# Patient Record
Sex: MALE | Race: WHITE | NOT HISPANIC OR LATINO | Employment: FULL TIME | URBAN - METROPOLITAN AREA
[De-identification: names, ages, dates, MRNs, and addresses within clinical notes are randomized per-mention and may not be internally consistent; named-entity substitution may affect disease eponyms.]

---

## 2017-01-16 ENCOUNTER — COMMUNICATION - HEALTHEAST (OUTPATIENT)
Dept: FAMILY MEDICINE | Facility: CLINIC | Age: 52
End: 2017-01-16

## 2017-01-25 ENCOUNTER — RECORDS - HEALTHEAST (OUTPATIENT)
Dept: ADMINISTRATIVE | Facility: OTHER | Age: 52
End: 2017-01-25

## 2017-02-17 ENCOUNTER — COMMUNICATION - HEALTHEAST (OUTPATIENT)
Dept: FAMILY MEDICINE | Facility: CLINIC | Age: 52
End: 2017-02-17

## 2017-02-28 ENCOUNTER — OFFICE VISIT - HEALTHEAST (OUTPATIENT)
Dept: FAMILY MEDICINE | Facility: CLINIC | Age: 52
End: 2017-02-28

## 2017-02-28 DIAGNOSIS — E11.9 TYPE 2 DIABETES MELLITUS WITHOUT COMPLICATION (H): ICD-10-CM

## 2017-02-28 ASSESSMENT — MIFFLIN-ST. JEOR: SCORE: 1642.72

## 2017-06-12 ENCOUNTER — COMMUNICATION - HEALTHEAST (OUTPATIENT)
Dept: FAMILY MEDICINE | Facility: CLINIC | Age: 52
End: 2017-06-12

## 2017-12-13 ENCOUNTER — OFFICE VISIT - HEALTHEAST (OUTPATIENT)
Dept: FAMILY MEDICINE | Facility: CLINIC | Age: 52
End: 2017-12-13

## 2017-12-13 DIAGNOSIS — Z23 NEED FOR IMMUNIZATION AGAINST INFLUENZA: ICD-10-CM

## 2017-12-13 DIAGNOSIS — K42.9 UMBILICAL HERNIA: ICD-10-CM

## 2017-12-13 ASSESSMENT — MIFFLIN-ST. JEOR: SCORE: 1647.26

## 2017-12-28 ENCOUNTER — COMMUNICATION - HEALTHEAST (OUTPATIENT)
Dept: SURGERY | Facility: CLINIC | Age: 52
End: 2017-12-28

## 2018-01-08 ENCOUNTER — OFFICE VISIT - HEALTHEAST (OUTPATIENT)
Dept: SURGERY | Facility: CLINIC | Age: 53
End: 2018-01-08

## 2018-01-08 DIAGNOSIS — K43.2 RECURRENT INCISIONAL HERNIA: ICD-10-CM

## 2018-01-08 ASSESSMENT — MIFFLIN-ST. JEOR: SCORE: 1643.18

## 2018-01-10 ENCOUNTER — OFFICE VISIT - HEALTHEAST (OUTPATIENT)
Dept: FAMILY MEDICINE | Facility: CLINIC | Age: 53
End: 2018-01-10

## 2018-01-10 DIAGNOSIS — Z01.818 PREOP EXAMINATION: ICD-10-CM

## 2018-01-10 DIAGNOSIS — K42.9 UMBILICAL HERNIA WITHOUT OBSTRUCTION AND WITHOUT GANGRENE: ICD-10-CM

## 2018-01-10 DIAGNOSIS — E11.9 DIABETES (H): ICD-10-CM

## 2018-01-10 LAB
ANION GAP SERPL CALCULATED.3IONS-SCNC: 11 MMOL/L (ref 5–18)
BUN SERPL-MCNC: 19 MG/DL (ref 8–22)
CALCIUM SERPL-MCNC: 9.8 MG/DL (ref 8.5–10.5)
CHLORIDE BLD-SCNC: 101 MMOL/L (ref 98–107)
CHOLEST SERPL-MCNC: 235 MG/DL
CO2 SERPL-SCNC: 24 MMOL/L (ref 22–31)
CREAT SERPL-MCNC: 0.98 MG/DL (ref 0.7–1.3)
ERYTHROCYTE [DISTWIDTH] IN BLOOD BY AUTOMATED COUNT: 12.2 % (ref 11–14.5)
FASTING STATUS PATIENT QL REPORTED: NO
GFR SERPL CREATININE-BSD FRML MDRD: >60 ML/MIN/1.73M2
GLUCOSE BLD-MCNC: 146 MG/DL (ref 70–125)
HBA1C MFR BLD: 9.1 % (ref 3.5–6)
HCT VFR BLD AUTO: 45.3 % (ref 40–54)
HDLC SERPL-MCNC: 41 MG/DL
HGB BLD-MCNC: 15.1 G/DL (ref 14–18)
LDLC SERPL CALC-MCNC: 118 MG/DL
LDLC SERPL CALC-MCNC: ABNORMAL MG/DL
MCH RBC QN AUTO: 30.3 PG (ref 27–34)
MCHC RBC AUTO-ENTMCNC: 33.3 G/DL (ref 32–36)
MCV RBC AUTO: 91 FL (ref 80–100)
PLATELET # BLD AUTO: 308 THOU/UL (ref 140–440)
PMV BLD AUTO: 7 FL (ref 7–10)
POTASSIUM BLD-SCNC: 4 MMOL/L (ref 3.5–5)
RBC # BLD AUTO: 4.98 MILL/UL (ref 4.4–6.2)
SODIUM SERPL-SCNC: 136 MMOL/L (ref 136–145)
TRIGL SERPL-MCNC: 627 MG/DL
WBC: 7 THOU/UL (ref 4–11)

## 2018-01-10 ASSESSMENT — MIFFLIN-ST. JEOR: SCORE: 1647.26

## 2018-01-15 ENCOUNTER — COMMUNICATION - HEALTHEAST (OUTPATIENT)
Dept: FAMILY MEDICINE | Facility: CLINIC | Age: 53
End: 2018-01-15

## 2018-01-24 ASSESSMENT — MIFFLIN-ST. JEOR: SCORE: 1663.14

## 2018-01-25 ENCOUNTER — ANESTHESIA - HEALTHEAST (OUTPATIENT)
Dept: SURGERY | Facility: HOSPITAL | Age: 53
End: 2018-01-25

## 2018-01-26 ENCOUNTER — SURGERY - HEALTHEAST (OUTPATIENT)
Dept: SURGERY | Facility: HOSPITAL | Age: 53
End: 2018-01-26

## 2018-02-01 ENCOUNTER — AMBULATORY - HEALTHEAST (OUTPATIENT)
Dept: SURGERY | Facility: CLINIC | Age: 53
End: 2018-02-01

## 2018-02-09 ENCOUNTER — COMMUNICATION - HEALTHEAST (OUTPATIENT)
Dept: SCHEDULING | Facility: CLINIC | Age: 53
End: 2018-02-09

## 2018-02-09 ENCOUNTER — OFFICE VISIT - HEALTHEAST (OUTPATIENT)
Dept: SURGERY | Facility: CLINIC | Age: 53
End: 2018-02-09

## 2018-02-09 DIAGNOSIS — Z48.89 POSTOPERATIVE VISIT: ICD-10-CM

## 2018-03-06 ENCOUNTER — COMMUNICATION - HEALTHEAST (OUTPATIENT)
Dept: FAMILY MEDICINE | Facility: CLINIC | Age: 53
End: 2018-03-06

## 2018-03-06 DIAGNOSIS — E11.9 TYPE 2 DIABETES MELLITUS WITHOUT COMPLICATION (H): ICD-10-CM

## 2018-03-12 ENCOUNTER — AMBULATORY - HEALTHEAST (OUTPATIENT)
Dept: FAMILY MEDICINE | Facility: CLINIC | Age: 53
End: 2018-03-12

## 2018-03-12 ENCOUNTER — AMBULATORY - HEALTHEAST (OUTPATIENT)
Dept: LAB | Facility: CLINIC | Age: 53
End: 2018-03-12

## 2018-03-12 LAB — HBA1C MFR BLD: 8.7 % (ref 3.5–6)

## 2018-03-14 ENCOUNTER — COMMUNICATION - HEALTHEAST (OUTPATIENT)
Dept: FAMILY MEDICINE | Facility: CLINIC | Age: 53
End: 2018-03-14

## 2018-03-26 ENCOUNTER — AMBULATORY - HEALTHEAST (OUTPATIENT)
Dept: FAMILY MEDICINE | Facility: CLINIC | Age: 53
End: 2018-03-26

## 2018-03-26 ENCOUNTER — OFFICE VISIT - HEALTHEAST (OUTPATIENT)
Dept: FAMILY MEDICINE | Facility: CLINIC | Age: 53
End: 2018-03-26

## 2018-03-26 DIAGNOSIS — M79.2 NEURITIS: ICD-10-CM

## 2018-03-26 DIAGNOSIS — K21.9 ESOPHAGEAL REFLUX: ICD-10-CM

## 2018-03-26 DIAGNOSIS — Z00.00 HEALTHCARE MAINTENANCE: ICD-10-CM

## 2018-03-26 LAB
TSH SERPL DL<=0.005 MIU/L-ACNC: 1.67 UIU/ML (ref 0.3–5)
VIT B12 SERPL-MCNC: 597 PG/ML (ref 213–816)

## 2018-03-26 ASSESSMENT — MIFFLIN-ST. JEOR: SCORE: 1633.65

## 2018-03-27 ENCOUNTER — COMMUNICATION - HEALTHEAST (OUTPATIENT)
Dept: FAMILY MEDICINE | Facility: CLINIC | Age: 53
End: 2018-03-27

## 2018-04-26 ENCOUNTER — RECORDS - HEALTHEAST (OUTPATIENT)
Dept: ADMINISTRATIVE | Facility: OTHER | Age: 53
End: 2018-04-26

## 2018-05-03 ENCOUNTER — COMMUNICATION - HEALTHEAST (OUTPATIENT)
Dept: FAMILY MEDICINE | Facility: CLINIC | Age: 53
End: 2018-05-03

## 2018-05-06 ENCOUNTER — COMMUNICATION - HEALTHEAST (OUTPATIENT)
Dept: FAMILY MEDICINE | Facility: CLINIC | Age: 53
End: 2018-05-06

## 2018-05-06 DIAGNOSIS — E11.9 TYPE 2 DIABETES MELLITUS WITHOUT COMPLICATION (H): ICD-10-CM

## 2018-05-24 ENCOUNTER — COMMUNICATION - HEALTHEAST (OUTPATIENT)
Dept: FAMILY MEDICINE | Facility: CLINIC | Age: 53
End: 2018-05-24

## 2018-06-01 ENCOUNTER — COMMUNICATION - HEALTHEAST (OUTPATIENT)
Dept: FAMILY MEDICINE | Facility: CLINIC | Age: 53
End: 2018-06-01

## 2018-06-01 DIAGNOSIS — E11.9 TYPE 2 DIABETES MELLITUS WITHOUT COMPLICATION (H): ICD-10-CM

## 2018-06-23 ENCOUNTER — COMMUNICATION - HEALTHEAST (OUTPATIENT)
Dept: FAMILY MEDICINE | Facility: CLINIC | Age: 53
End: 2018-06-23

## 2018-09-04 ENCOUNTER — COMMUNICATION - HEALTHEAST (OUTPATIENT)
Dept: FAMILY MEDICINE | Facility: CLINIC | Age: 53
End: 2018-09-04

## 2018-09-11 ENCOUNTER — AMBULATORY - HEALTHEAST (OUTPATIENT)
Dept: FAMILY MEDICINE | Facility: CLINIC | Age: 53
End: 2018-09-11

## 2018-09-11 DIAGNOSIS — E11.9 DIABETES MELLITUS (H): ICD-10-CM

## 2018-10-01 ENCOUNTER — OFFICE VISIT - HEALTHEAST (OUTPATIENT)
Dept: FAMILY MEDICINE | Facility: CLINIC | Age: 53
End: 2018-10-01

## 2018-10-01 DIAGNOSIS — E11.9 DIABETES MELLITUS, TYPE 2 (H): ICD-10-CM

## 2018-10-01 DIAGNOSIS — R19.7 DIARRHEA, UNSPECIFIED TYPE: ICD-10-CM

## 2018-10-01 DIAGNOSIS — G62.9 PERIPHERAL NEUROPATHY: ICD-10-CM

## 2018-10-01 LAB — HBA1C MFR BLD: 9.5 % (ref 3.5–6)

## 2018-10-01 ASSESSMENT — MIFFLIN-ST. JEOR: SCORE: 1642.72

## 2018-10-02 ENCOUNTER — COMMUNICATION - HEALTHEAST (OUTPATIENT)
Dept: FAMILY MEDICINE | Facility: CLINIC | Age: 53
End: 2018-10-02

## 2018-10-02 LAB
ANION GAP SERPL CALCULATED.3IONS-SCNC: 15 MMOL/L (ref 5–18)
BUN SERPL-MCNC: 23 MG/DL (ref 8–22)
CALCIUM SERPL-MCNC: 10.3 MG/DL (ref 8.5–10.5)
CHLORIDE BLD-SCNC: 104 MMOL/L (ref 98–107)
CO2 SERPL-SCNC: 20 MMOL/L (ref 22–31)
CREAT SERPL-MCNC: 1.18 MG/DL (ref 0.7–1.3)
GFR SERPL CREATININE-BSD FRML MDRD: >60 ML/MIN/1.73M2
GLUCOSE BLD-MCNC: 257 MG/DL (ref 70–125)
POTASSIUM BLD-SCNC: 4.4 MMOL/L (ref 3.5–5)
SODIUM SERPL-SCNC: 139 MMOL/L (ref 136–145)

## 2018-10-08 ENCOUNTER — RECORDS - HEALTHEAST (OUTPATIENT)
Dept: ADMINISTRATIVE | Facility: OTHER | Age: 53
End: 2018-10-08

## 2018-10-17 ENCOUNTER — COMMUNICATION - HEALTHEAST (OUTPATIENT)
Dept: FAMILY MEDICINE | Facility: CLINIC | Age: 53
End: 2018-10-17

## 2018-10-18 ENCOUNTER — AMBULATORY - HEALTHEAST (OUTPATIENT)
Dept: FAMILY MEDICINE | Facility: CLINIC | Age: 53
End: 2018-10-18

## 2018-10-18 DIAGNOSIS — E11.9 TYPE 2 DIABETES MELLITUS WITHOUT COMPLICATION, WITHOUT LONG-TERM CURRENT USE OF INSULIN (H): ICD-10-CM

## 2018-12-10 ENCOUNTER — RECORDS - HEALTHEAST (OUTPATIENT)
Dept: ADMINISTRATIVE | Facility: OTHER | Age: 53
End: 2018-12-10

## 2018-12-10 ENCOUNTER — RECORDS - HEALTHEAST (OUTPATIENT)
Dept: LAB | Facility: HOSPITAL | Age: 53
End: 2018-12-10

## 2018-12-10 LAB
ERYTHROCYTE [SEDIMENTATION RATE] IN BLOOD BY WESTERGREN METHOD: 8 MM/HR (ref 0–15)
LYME TOTAL ANTIBODY - HISTORICAL: 0.04 INDEX VALUE

## 2018-12-12 LAB
ALBUMIN PERCENT: 60.5 % (ref 51–67)
ALBUMIN SERPL ELPH-MCNC: 4.9 G/DL (ref 3.2–4.7)
ALPHA 1 PERCENT: 1.8 % (ref 2–4)
ALPHA 2 PERCENT: 7.5 % (ref 5–13)
ALPHA1 GLOB SERPL ELPH-MCNC: 0.1 G/DL (ref 0.1–0.3)
ALPHA2 GLOB SERPL ELPH-MCNC: 0.6 G/DL (ref 0.4–0.9)
B-GLOBULIN SERPL ELPH-MCNC: 1.1 G/DL (ref 0.7–1.2)
BETA PERCENT: 14.1 % (ref 10–17)
GAMMA GLOB SERPL ELPH-MCNC: 1.3 G/DL (ref 0.6–1.4)
GAMMA GLOBULIN PERCENT: 16.1 % (ref 9–20)
PATH ICD:: ABNORMAL
PROT PATTERN SERPL ELPH-IMP: ABNORMAL
PROT SERPL-MCNC: 8.1 G/DL (ref 6–8)
REVIEWING PATHOLOGIST: ABNORMAL

## 2018-12-20 ENCOUNTER — OFFICE VISIT - HEALTHEAST (OUTPATIENT)
Dept: FAMILY MEDICINE | Facility: CLINIC | Age: 53
End: 2018-12-20

## 2018-12-20 DIAGNOSIS — G62.89 OTHER POLYNEUROPATHY: ICD-10-CM

## 2018-12-20 DIAGNOSIS — Z23 NEED FOR IMMUNIZATION AGAINST INFLUENZA: ICD-10-CM

## 2018-12-20 DIAGNOSIS — E78.5 HYPERLIPIDEMIA, UNSPECIFIED HYPERLIPIDEMIA TYPE: ICD-10-CM

## 2018-12-20 DIAGNOSIS — E11.9 TYPE 2 DIABETES MELLITUS WITHOUT COMPLICATION, WITHOUT LONG-TERM CURRENT USE OF INSULIN (H): ICD-10-CM

## 2018-12-20 LAB
CHOLEST SERPL-MCNC: 308 MG/DL
CREAT UR-MCNC: 54.1 MG/DL
FASTING STATUS PATIENT QL REPORTED: YES
HBA1C MFR BLD: 9.3 % (ref 3.5–6)
HDLC SERPL-MCNC: 43 MG/DL
LDLC SERPL CALC-MCNC: 156 MG/DL
LDLC SERPL CALC-MCNC: ABNORMAL MG/DL
MICROALBUMIN UR-MCNC: 1.72 MG/DL (ref 0–1.99)
MICROALBUMIN/CREAT UR: 31.8 MG/G
TRIGL SERPL-MCNC: 597 MG/DL

## 2018-12-21 LAB
PATH ICD:: NORMAL
PROT PATTERN SERPL IFE-IMP: NORMAL
REVIEWING PATHOLOGIST: NORMAL

## 2018-12-26 ENCOUNTER — COMMUNICATION - HEALTHEAST (OUTPATIENT)
Dept: FAMILY MEDICINE | Facility: CLINIC | Age: 53
End: 2018-12-26

## 2018-12-26 DIAGNOSIS — G63 NEUROPATHY WITH IGM MONOCLONAL GAMMOPATHY (H): ICD-10-CM

## 2018-12-26 DIAGNOSIS — D47.2 NEUROPATHY WITH IGM MONOCLONAL GAMMOPATHY (H): ICD-10-CM

## 2019-01-03 ENCOUNTER — TELEPHONE (OUTPATIENT)
Dept: ONCOLOGY | Facility: CLINIC | Age: 54
End: 2019-01-03

## 2019-01-03 ENCOUNTER — COMMUNICATION - HEALTHEAST (OUTPATIENT)
Dept: ONCOLOGY | Facility: HOSPITAL | Age: 54
End: 2019-01-03

## 2019-01-03 NOTE — TELEPHONE ENCOUNTER
ONCOLOGY INTAKE: Records Information      APPT INFORMATION:  Referring provider:  roland Astudillo   Referring provider s clinic:  [facility]  Reason for visit/diagnosis:  Monoclonal Gammopathy    Were the records received with the referral (via [employer])? Referring clinic sending via fax 1/3/2019 10:22 AM     Has patient been seen for any external appt for this diagnosis (enter clinic/location)? No

## 2019-01-15 ENCOUNTER — COMMUNICATION - HEALTHEAST (OUTPATIENT)
Dept: FAMILY MEDICINE | Facility: CLINIC | Age: 54
End: 2019-01-15

## 2019-01-15 DIAGNOSIS — E11.9 TYPE 2 DIABETES MELLITUS WITHOUT COMPLICATION, WITHOUT LONG-TERM CURRENT USE OF INSULIN (H): ICD-10-CM

## 2019-01-27 NOTE — TELEPHONE ENCOUNTER
RECORDS STATUS - ALL OTHER DIAGNOSIS      RECORDS RECEIVED FROM: [facility] (in [facility])   DATE RECEIVED: 1/27/19   NOTES STATUS DETAILS   OFFICE NOTE from referring provider 12/20/18 [facility] (In [facility])     OFFICE NOTE from medical oncologist In [facility] (In [facility])     DISCHARGE SUMMARY from hospital In [facility] (In [facility])     DISCHARGE REPORT from the ER In [facility] (In [facility])     OPERATIVE REPORT In [facility] (In [facility])     MEDICATION LIST In [facility] (In [facility])     CLINICAL TRIAL TREATMENTS TO DATE In [facility] (In [facility])     LABS     PATHOLOGY REPORTS In [facility] (In [facility])     ANYTHING RELATED TO DIAGNOSIS In [facility] (In [facility])     GENONOMIC TESTING     TYPE: In [facility] (In [facility])   IMAGING (NEED IMAGES & REPORT)     CT SCANS In [facility] (In [facility])   MRI In [facility] (In [facility])   MAMMO In [facility] (In [facility])   ULTRASOUND In [facility] (In [facility])   PET In [facility] (In [facility])

## 2019-01-30 ENCOUNTER — RECORDS - HEALTHEAST (OUTPATIENT)
Dept: ADMINISTRATIVE | Facility: OTHER | Age: 54
End: 2019-01-30

## 2019-01-30 ENCOUNTER — ONCOLOGY VISIT (OUTPATIENT)
Dept: ONCOLOGY | Facility: CLINIC | Age: 54
End: 2019-01-30
Attending: INTERNAL MEDICINE
Payer: COMMERCIAL

## 2019-01-30 ENCOUNTER — PRE VISIT (OUTPATIENT)
Dept: ONCOLOGY | Facility: CLINIC | Age: 54
End: 2019-01-30

## 2019-01-30 VITALS
HEART RATE: 64 BPM | RESPIRATION RATE: 16 BRPM | OXYGEN SATURATION: 95 % | DIASTOLIC BLOOD PRESSURE: 84 MMHG | TEMPERATURE: 97 F | SYSTOLIC BLOOD PRESSURE: 130 MMHG | WEIGHT: 175.8 LBS

## 2019-01-30 DIAGNOSIS — D47.2 MONOCLONAL PARAPROTEINEMIA: Primary | ICD-10-CM

## 2019-01-30 DIAGNOSIS — D47.2 MONOCLONAL PARAPROTEINEMIA: ICD-10-CM

## 2019-01-30 LAB
ALBUMIN SERPL-MCNC: 4.2 G/DL (ref 3.4–5)
ALP SERPL-CCNC: 40 U/L (ref 40–150)
ALT SERPL W P-5'-P-CCNC: 24 U/L (ref 0–70)
ANION GAP SERPL CALCULATED.3IONS-SCNC: 9 MMOL/L (ref 3–14)
AST SERPL W P-5'-P-CCNC: 18 U/L (ref 0–45)
BASOPHILS # BLD AUTO: 0.1 10E9/L (ref 0–0.2)
BASOPHILS NFR BLD AUTO: 0.9 %
BILIRUB SERPL-MCNC: 0.5 MG/DL (ref 0.2–1.3)
BUN SERPL-MCNC: 25 MG/DL (ref 7–30)
CALCIUM SERPL-MCNC: 8.9 MG/DL (ref 8.5–10.1)
CHLORIDE SERPL-SCNC: 108 MMOL/L (ref 94–109)
CO2 SERPL-SCNC: 22 MMOL/L (ref 20–32)
CREAT SERPL-MCNC: 0.98 MG/DL (ref 0.66–1.25)
DIFFERENTIAL METHOD BLD: NORMAL
EOSINOPHIL # BLD AUTO: 0.1 10E9/L (ref 0–0.7)
EOSINOPHIL NFR BLD AUTO: 1.7 %
ERYTHROCYTE [DISTWIDTH] IN BLOOD BY AUTOMATED COUNT: 13.5 % (ref 10–15)
GFR SERPL CREATININE-BSD FRML MDRD: 86 ML/MIN/{1.73_M2}
GLUCOSE SERPL-MCNC: 111 MG/DL (ref 70–99)
HCT VFR BLD AUTO: 50.7 % (ref 40–53)
HGB BLD-MCNC: 17.3 G/DL (ref 13.3–17.7)
IMM GRANULOCYTES # BLD: 0 10E9/L (ref 0–0.4)
IMM GRANULOCYTES NFR BLD: 0.3 %
LDH SERPL L TO P-CCNC: 148 U/L (ref 85–227)
LYMPHOCYTES # BLD AUTO: 3.3 10E9/L (ref 0.8–5.3)
LYMPHOCYTES NFR BLD AUTO: 47.4 %
MCH RBC QN AUTO: 30.4 PG (ref 26.5–33)
MCHC RBC AUTO-ENTMCNC: 34.1 G/DL (ref 31.5–36.5)
MCV RBC AUTO: 89 FL (ref 78–100)
MONOCYTES # BLD AUTO: 0.4 10E9/L (ref 0–1.3)
MONOCYTES NFR BLD AUTO: 6.3 %
NEUTROPHILS # BLD AUTO: 3 10E9/L (ref 1.6–8.3)
NEUTROPHILS NFR BLD AUTO: 43.4 %
NRBC # BLD AUTO: 0 10*3/UL
NRBC BLD AUTO-RTO: 0 /100
PLATELET # BLD AUTO: 277 10E9/L (ref 150–450)
POTASSIUM SERPL-SCNC: 3.8 MMOL/L (ref 3.4–5.3)
PROT SERPL-MCNC: 8.8 G/DL (ref 6.8–8.8)
RBC # BLD AUTO: 5.7 10E12/L (ref 4.4–5.9)
SODIUM SERPL-SCNC: 138 MMOL/L (ref 133–144)
WBC # BLD AUTO: 6.9 10E9/L (ref 4–11)

## 2019-01-30 PROCEDURE — 86334 IMMUNOFIX E-PHORESIS SERUM: CPT | Performed by: INTERNAL MEDICINE

## 2019-01-30 PROCEDURE — 82784 ASSAY IGA/IGD/IGG/IGM EACH: CPT | Performed by: INTERNAL MEDICINE

## 2019-01-30 PROCEDURE — 82232 ASSAY OF BETA-2 PROTEIN: CPT | Performed by: INTERNAL MEDICINE

## 2019-01-30 PROCEDURE — 36415 COLL VENOUS BLD VENIPUNCTURE: CPT

## 2019-01-30 PROCEDURE — 83883 ASSAY NEPHELOMETRY NOT SPEC: CPT | Performed by: INTERNAL MEDICINE

## 2019-01-30 PROCEDURE — 80053 COMPREHEN METABOLIC PANEL: CPT | Performed by: INTERNAL MEDICINE

## 2019-01-30 PROCEDURE — 99205 OFFICE O/P NEW HI 60 MIN: CPT | Mod: ZP | Performed by: INTERNAL MEDICINE

## 2019-01-30 PROCEDURE — 00000402 ZZHCL STATISTIC TOTAL PROTEIN: Performed by: INTERNAL MEDICINE

## 2019-01-30 PROCEDURE — G0463 HOSPITAL OUTPT CLINIC VISIT: HCPCS | Mod: ZF

## 2019-01-30 PROCEDURE — 83615 LACTATE (LD) (LDH) ENZYME: CPT | Performed by: INTERNAL MEDICINE

## 2019-01-30 PROCEDURE — 85025 COMPLETE CBC W/AUTO DIFF WBC: CPT | Performed by: INTERNAL MEDICINE

## 2019-01-30 PROCEDURE — 84165 PROTEIN E-PHORESIS SERUM: CPT | Performed by: INTERNAL MEDICINE

## 2019-01-30 RX ORDER — LISINOPRIL 5 MG/1
TABLET ORAL
Refills: 1 | COMMUNITY
Start: 2019-01-10 | End: 2023-11-15

## 2019-01-30 RX ORDER — EMPAGLIFLOZIN 10 MG/1
TABLET, FILM COATED ORAL
Refills: 11 | COMMUNITY
Start: 2019-01-17 | End: 2022-09-13

## 2019-01-30 RX ORDER — TERAZOSIN 2 MG/1
CAPSULE ORAL
Refills: 3 | COMMUNITY
Start: 2019-01-10

## 2019-01-30 RX ORDER — GLIMEPIRIDE 2 MG/1
TABLET ORAL
Refills: 10 | COMMUNITY
Start: 2019-01-16 | End: 2021-10-30

## 2019-01-30 RX ORDER — VALACYCLOVIR HYDROCHLORIDE 500 MG/1
TABLET, FILM COATED ORAL
Refills: 0 | COMMUNITY
Start: 2019-01-10

## 2019-01-30 RX ORDER — FENOFIBRATE 145 MG/1
145 TABLET, COATED ORAL
COMMUNITY

## 2019-01-30 RX ORDER — EMTRICITABINE AND TENOFOVIR ALAFENAMIDE 200; 25 MG/1; MG/1
TABLET ORAL
Refills: 1 | COMMUNITY
Start: 2019-01-17 | End: 2024-10-03

## 2019-01-30 SDOH — HEALTH STABILITY: MENTAL HEALTH: HOW OFTEN DO YOU HAVE A DRINK CONTAINING ALCOHOL?: 2-3 TIMES A WEEK

## 2019-01-30 SDOH — HEALTH STABILITY: MENTAL HEALTH: HOW MANY STANDARD DRINKS CONTAINING ALCOHOL DO YOU HAVE ON A TYPICAL DAY?: 1 OR 2

## 2019-01-30 ASSESSMENT — PAIN SCALES - GENERAL: PAINLEVEL: NO PAIN (0)

## 2019-01-30 NOTE — NURSING NOTE
Chief Complaint   Patient presents with     Lab Only     labs drawn via vpt by rn. 24 hour urine jug and instructions given to patient.      Blood drawn via vpt by RN in lab. 24 urine jug given to patient.     Chantelle Mota RN

## 2019-01-30 NOTE — NURSING NOTE
Oncology Rooming Note    January 30, 2019 11:12 AM   Pb Langston is a 53 year old male who presents for:    Chief Complaint   Patient presents with     Oncology Clinic Visit     Gammopathy     Initial Vitals: /84   Pulse 64   Temp 97  F (36.1  C) (Tympanic)   Resp 16   Wt 79.7 kg (175 lb 12.8 oz)   SpO2 95%  There is no height or weight on file to calculate BMI. There is no height or weight on file to calculate BSA.  No Pain (0) Comment: Data Unavailable   No LMP for male patient.  Allergies reviewed: Yes  Medications reviewed: Yes    Medications: Medication refills not needed today.  [facility] into [facility] [address] [place] [address] AT [facility]    Clinical concerns: No New Concerns    5 minutes for nursing intake (face to face time)     ABRAN Lanza

## 2019-01-30 NOTE — PROGRESS NOTES
[facility] Hematology Consultation    Outpatient Visit Note:    Patient: Pb Langston  MRN: [id number]  : 1965  JOHANNA: 2019    Reason for Consultation:  Pb Langston is for evaluation and treatment of monoclonal gammopathy     History of Present Illness:  Pb Langston is a 53 year old male with HIV and syndrome X who had been complaining of peripheral neuropathy and underwent further evaluation. He states that he has been experiencing night time sharp pain in leg that progressed to arms and now top of his scalp and his PCP did further work up that found a barely detectable monoclonal spike of Ig M Kappa. He was sent to me for further evaluation and discussion to see if this is contributing to the neuropathy that the patient is experiencing.     Pt denies SOB , cough, chest pain , fevers , chills, nausea , vomiting , abdominal pain, change in bowel / bladder habits,   No c/o sore throat , mucositis,Lowe extremity swelling ,  bleeding gums, tingling or numbness, easy bruising , muscular weakness,  weight loss. Pt has not noticed any new swelling/ lymph glands .  He states that he is a bit fatigued but nothing out of proportion.         Past Medical History:  Past Medical History:   Diagnosis Date     Sandoval's esophagus      DM (diabetes mellitus) (H)      GERD (gastroesophageal reflux disease)      HIV (human immunodeficiency virus infection) (H)      HLD (hyperlipidemia)      HTN (hypertension)      MARIE (obstructive sleep apnea)        Past Surgical History:  Past Surgical History:   Procedure Laterality Date     BONE REPLACEMENT GRAFT      scaphoid graft      CHOLECYSTECTOMY       EXCISE CYST THYROGLOSSAL DUCT       JOINT REPLACEMENT         Medications:  Current Outpatient Medications   Medication Sig Dispense Refill     darunavir-cobicistat (PREZCOBIX) 800-150 MG per tablet Take 800 mg by mouth       dolutegravir (TIVICAY) 50 MG tablet Take 50 mg by mouth       DESCOVY  "200-25 MG per tablet TK 1 T PO D  1     fenofibrate (TRICOR) 145 MG tablet Take 145 mg by mouth       glimepiride (AMARYL) 2 MG tablet   10     JARDIANCE 10 MG TABS tablet   11     lisinopril (PRINIVIL/ZESTRIL) 5 MG tablet   1     omeprazole (PRILOSEC) 20 MG DR capsule   0     terazosin (HYTRIN) 2 MG capsule   3     valACYclovir (VALTREX) 500 MG tablet   0        Allergies:  Allergies   Allergen Reactions     Cephalexin Hives     \"severe hives\"       Cephalosporins      Severe hives with keflex     Codeine Nausea     Oxycodone Nausea and Vomiting     Penicillins Itching, Rash and Unknown     rash       Sulfa Drugs Unknown, Itching and Rash     rash         ROS:  A 14 point ROS is negative except as stated in the HPI    Social History:  Denies any tobacco use. No significant alcohol use. Denies any illicit drug use. Patient works as a computer      Family History:  Family History   Problem Relation Age of Onset     Cancer Father         prostate      Cancer Nephew         leukemia      Cancer Paternal Uncle         leukemia      Cancer Maternal Aunt         unknown          Objective:  Vitals: B/P: 130/84, T: 97, P: 64, R: 16, Wt: 175 lbs 12.8 oz There is no height or weight on file to calculate BMI.   Exam:   Gen: Appears well, no distress  HEENT: no scleral icterus or hemorrhage, no wet purpura, no lymphadenopathy  CV: regular, no murmurs  Pulm: clear, dec BS at the R lung base.   Abd: soft, nontender, no splenomegaly  Ext: no edema  Skin: no ecchymoses or hematomas  Neuro: no focal deficits, affect and cognition are normal    Labs:  Reviewed OSH labs in Care everywhere     Imaging:  None     Assessment:  In summary, Pb Langston is a 53 year old male with HIV and other co morbidities being evaluated for Monoclonal gammopathy     Plan:  1. Majority of today's visit was spent counseling the patient regarding Monoclonal gammopathy and his peripheral neuropathy    We discussed MGUS ( possibly related " to his HIV)  and Ig M spike and how this could also be WM.   We will send labs   Orders Placed This Encounter   Procedures     Comprehensive metabolic panel     CBC with platelets differential     Protein electrophoresis     Protein Immunofixation Serum     Free Kappa and Lambda Light Chains Urine     Gladstone and lambda light chain     Protein electrophoresis timed urine     Protein immunofixation urine     Beta 2 microglobulin     Lactate Dehydrogenase     I will see him back in 2 weeks to review the labs     Peripheral neuropathy - could be related to his other comorbidies and meds from his HIV.     The patient is given our center's contact information and is instructed to call if he should have any further questions or concerns.      I spent 45 minutes in the care of this patient >50% of which was spent in coordinating and counseling.  Vania Marti   of Medicine   Hematology and medical Oncology   [facility]

## 2019-01-31 LAB
ALBUMIN SERPL ELPH-MCNC: 5 G/DL (ref 3.7–5.1)
ALPHA1 GLOB SERPL ELPH-MCNC: 0.3 G/DL (ref 0.2–0.4)
ALPHA2 GLOB SERPL ELPH-MCNC: 0.5 G/DL (ref 0.5–0.9)
B-GLOBULIN SERPL ELPH-MCNC: 1.2 G/DL (ref 0.6–1)
B2 MICROGLOB SERPL-MCNC: 2 MG/L
GAMMA GLOB SERPL ELPH-MCNC: 1.7 G/DL (ref 0.7–1.6)
IGA SERPL-MCNC: 534 MG/DL (ref 70–380)
IGG SERPL-MCNC: 1570 MG/DL (ref 695–1620)
IGM SERPL-MCNC: 196 MG/DL (ref 60–265)
KAPPA LC UR-MCNC: 1.91 MG/DL (ref 0.33–1.94)
KAPPA LC/LAMBDA SER: 1.11 {RATIO} (ref 0.26–1.65)
LAMBDA LC SERPL-MCNC: 1.72 MG/DL (ref 0.57–2.63)
M PROTEIN SERPL ELPH-MCNC: 0 G/DL
PROT PATTERN SERPL ELPH-IMP: ABNORMAL
PROT PATTERN SERPL IFE-IMP: ABNORMAL

## 2019-02-01 ENCOUNTER — APPOINTMENT (OUTPATIENT)
Dept: LAB | Facility: CLINIC | Age: 54
End: 2019-02-01
Payer: COMMERCIAL

## 2019-02-01 LAB
COLLECT DURATION TIME UR: 24 H
SPECIMEN VOL UR: 3580 ML

## 2019-02-03 LAB
ALBUMIN UR QL: DETECTED
ALPHA1 GLOB 24H UR QL ELPH: DETECTED
ALPHA2 GLOB UR QL ELPH: DETECTED
B-GLOBULIN UR QL ELPH: DETECTED
COLLECT DURATION TIME SPEC: 24 H
GAMMA GLOB UR QL ELPH: DETECTED
INTERPRETATION UR IFE-IMP: NORMAL
KAPPA LC FREE 24H UR-MRATE: 28.28 MG/D
KAPPA LC FREE UR-MCNC: 0.79 MG/DL (ref 0.14–2.42)
KAPPA LC FREE/LAMBDA FREE UR: 8.78 RATIO (ref 2.04–10.37)
LAMBDA LC FREE 24H UR-MRATE: 3.22 MG/D
LAMBDA LC FREE UR-MCNC: 0.09 MG/DL (ref 0.02–0.67)
PROT 24H UR-MRATE: 32 MG/D (ref 10–140)
SPECIMEN VOL ?TM UR: 3580 ML

## 2019-02-04 LAB
PROT ELPH PNL UR ELPH: NORMAL
PROT PATTERN UR ELPH-IMP: NORMAL

## 2019-03-25 ENCOUNTER — COMMUNICATION - HEALTHEAST (OUTPATIENT)
Dept: FAMILY MEDICINE | Facility: CLINIC | Age: 54
End: 2019-03-25

## 2019-04-15 ENCOUNTER — OFFICE VISIT - HEALTHEAST (OUTPATIENT)
Dept: FAMILY MEDICINE | Facility: CLINIC | Age: 54
End: 2019-04-15

## 2019-04-15 DIAGNOSIS — N48.89 PENILE PAIN: ICD-10-CM

## 2019-04-15 DIAGNOSIS — E11.9 TYPE 2 DIABETES MELLITUS WITHOUT COMPLICATION, WITHOUT LONG-TERM CURRENT USE OF INSULIN (H): ICD-10-CM

## 2019-04-15 LAB
ALBUMIN UR-MCNC: NEGATIVE MG/DL
ANION GAP SERPL CALCULATED.3IONS-SCNC: 13 MMOL/L (ref 5–18)
APPEARANCE UR: CLEAR
BILIRUB UR QL STRIP: NEGATIVE
BUN SERPL-MCNC: 23 MG/DL (ref 8–22)
CALCIUM SERPL-MCNC: 10.3 MG/DL (ref 8.5–10.5)
CHLORIDE BLD-SCNC: 101 MMOL/L (ref 98–107)
CHOLEST SERPL-MCNC: 313 MG/DL
CO2 SERPL-SCNC: 24 MMOL/L (ref 22–31)
COLOR UR AUTO: YELLOW
CREAT SERPL-MCNC: 1.11 MG/DL (ref 0.7–1.3)
FASTING STATUS PATIENT QL REPORTED: YES
GFR SERPL CREATININE-BSD FRML MDRD: >60 ML/MIN/1.73M2
GLUCOSE BLD-MCNC: 172 MG/DL (ref 70–125)
GLUCOSE UR STRIP-MCNC: ABNORMAL MG/DL
HBA1C MFR BLD: 8.2 % (ref 3.5–6)
HDLC SERPL-MCNC: 45 MG/DL
HGB UR QL STRIP: NEGATIVE
KETONES UR STRIP-MCNC: NEGATIVE MG/DL
LDLC SERPL CALC-MCNC: 154 MG/DL
LDLC SERPL CALC-MCNC: ABNORMAL MG/DL
LEUKOCYTE ESTERASE UR QL STRIP: NEGATIVE
NITRATE UR QL: NEGATIVE
PH UR STRIP: 5 [PH] (ref 5–8)
POTASSIUM BLD-SCNC: 4.6 MMOL/L (ref 3.5–5)
SODIUM SERPL-SCNC: 138 MMOL/L (ref 136–145)
SP GR UR STRIP: 1.01 (ref 1–1.03)
TRIGL SERPL-MCNC: 711 MG/DL
UROBILINOGEN UR STRIP-ACNC: ABNORMAL

## 2019-04-16 LAB
BACTERIA SPEC CULT: NO GROWTH
C TRACH DNA SPEC QL PROBE+SIG AMP: NEGATIVE
N GONORRHOEA DNA SPEC QL NAA+PROBE: NEGATIVE

## 2019-04-28 ENCOUNTER — COMMUNICATION - HEALTHEAST (OUTPATIENT)
Dept: FAMILY MEDICINE | Facility: CLINIC | Age: 54
End: 2019-04-28

## 2019-04-28 DIAGNOSIS — N48.89 PENILE PAIN: ICD-10-CM

## 2019-04-29 ENCOUNTER — AMBULATORY - HEALTHEAST (OUTPATIENT)
Dept: FAMILY MEDICINE | Facility: CLINIC | Age: 54
End: 2019-04-29

## 2019-04-29 DIAGNOSIS — E11.9 TYPE 2 DIABETES MELLITUS WITHOUT COMPLICATION, WITHOUT LONG-TERM CURRENT USE OF INSULIN (H): ICD-10-CM

## 2019-05-02 ENCOUNTER — RECORDS - HEALTHEAST (OUTPATIENT)
Dept: ADMINISTRATIVE | Facility: OTHER | Age: 54
End: 2019-05-02

## 2019-08-26 ENCOUNTER — COMMUNICATION - HEALTHEAST (OUTPATIENT)
Dept: FAMILY MEDICINE | Facility: CLINIC | Age: 54
End: 2019-08-26

## 2019-10-31 ENCOUNTER — COMMUNICATION - HEALTHEAST (OUTPATIENT)
Dept: FAMILY MEDICINE | Facility: CLINIC | Age: 54
End: 2019-10-31

## 2019-10-31 DIAGNOSIS — E11.9 TYPE 2 DIABETES MELLITUS WITHOUT COMPLICATION, WITHOUT LONG-TERM CURRENT USE OF INSULIN (H): ICD-10-CM

## 2019-11-04 ENCOUNTER — COMMUNICATION - HEALTHEAST (OUTPATIENT)
Dept: FAMILY MEDICINE | Facility: CLINIC | Age: 54
End: 2019-11-04

## 2019-11-11 ENCOUNTER — OFFICE VISIT - HEALTHEAST (OUTPATIENT)
Dept: FAMILY MEDICINE | Facility: CLINIC | Age: 54
End: 2019-11-11

## 2019-11-11 DIAGNOSIS — Z23 NEED FOR INFLUENZA VACCINATION: ICD-10-CM

## 2019-11-11 DIAGNOSIS — E11.9 TYPE 2 DIABETES MELLITUS WITHOUT COMPLICATION, WITHOUT LONG-TERM CURRENT USE OF INSULIN (H): ICD-10-CM

## 2019-11-11 DIAGNOSIS — Z23 NEED FOR SHINGLES VACCINE: ICD-10-CM

## 2019-11-11 LAB
ANION GAP SERPL CALCULATED.3IONS-SCNC: 10 MMOL/L (ref 5–18)
BUN SERPL-MCNC: 20 MG/DL (ref 8–22)
CALCIUM SERPL-MCNC: 9.7 MG/DL (ref 8.5–10.5)
CHLORIDE BLD-SCNC: 105 MMOL/L (ref 98–107)
CHOLEST SERPL-MCNC: 184 MG/DL
CO2 SERPL-SCNC: 25 MMOL/L (ref 22–31)
CREAT SERPL-MCNC: 1.21 MG/DL (ref 0.7–1.3)
FASTING STATUS PATIENT QL REPORTED: YES
GFR SERPL CREATININE-BSD FRML MDRD: >60 ML/MIN/1.73M2
GLUCOSE BLD-MCNC: 153 MG/DL (ref 70–125)
HBA1C MFR BLD: 9 % (ref 3.5–6)
HDLC SERPL-MCNC: 41 MG/DL
LDLC SERPL CALC-MCNC: 86 MG/DL
POTASSIUM BLD-SCNC: 4.1 MMOL/L (ref 3.5–5)
SODIUM SERPL-SCNC: 140 MMOL/L (ref 136–145)
TRIGL SERPL-MCNC: 285 MG/DL

## 2019-11-11 ASSESSMENT — MIFFLIN-ST. JEOR: SCORE: 1607

## 2019-11-26 ENCOUNTER — COMMUNICATION - HEALTHEAST (OUTPATIENT)
Dept: FAMILY MEDICINE | Facility: CLINIC | Age: 54
End: 2019-11-26

## 2019-11-26 DIAGNOSIS — E11.9 TYPE 2 DIABETES MELLITUS WITHOUT COMPLICATION, WITHOUT LONG-TERM CURRENT USE OF INSULIN (H): ICD-10-CM

## 2019-12-30 ENCOUNTER — COMMUNICATION - HEALTHEAST (OUTPATIENT)
Dept: FAMILY MEDICINE | Facility: CLINIC | Age: 54
End: 2019-12-30

## 2020-01-03 ENCOUNTER — COMMUNICATION - HEALTHEAST (OUTPATIENT)
Dept: FAMILY MEDICINE | Facility: CLINIC | Age: 55
End: 2020-01-03

## 2020-01-03 DIAGNOSIS — E11.9 TYPE 2 DIABETES MELLITUS WITHOUT COMPLICATION, WITHOUT LONG-TERM CURRENT USE OF INSULIN (H): ICD-10-CM

## 2020-02-02 ENCOUNTER — COMMUNICATION - HEALTHEAST (OUTPATIENT)
Dept: FAMILY MEDICINE | Facility: CLINIC | Age: 55
End: 2020-02-02

## 2020-02-27 ENCOUNTER — AMBULATORY - HEALTHEAST (OUTPATIENT)
Dept: FAMILY MEDICINE | Facility: CLINIC | Age: 55
End: 2020-02-27

## 2020-02-27 DIAGNOSIS — Z23 NEED FOR SHINGLES VACCINE: ICD-10-CM

## 2020-02-28 ENCOUNTER — AMBULATORY - HEALTHEAST (OUTPATIENT)
Dept: NURSING | Facility: CLINIC | Age: 55
End: 2020-02-28

## 2020-02-28 DIAGNOSIS — Z23 NEED FOR SHINGLES VACCINE: ICD-10-CM

## 2020-03-11 ENCOUNTER — HEALTH MAINTENANCE LETTER (OUTPATIENT)
Age: 55
End: 2020-03-11

## 2020-05-04 ENCOUNTER — COMMUNICATION - HEALTHEAST (OUTPATIENT)
Dept: FAMILY MEDICINE | Facility: CLINIC | Age: 55
End: 2020-05-04

## 2020-05-04 DIAGNOSIS — E11.9 TYPE 2 DIABETES MELLITUS WITHOUT COMPLICATION, WITHOUT LONG-TERM CURRENT USE OF INSULIN (H): ICD-10-CM

## 2020-06-09 ENCOUNTER — COMMUNICATION - HEALTHEAST (OUTPATIENT)
Dept: FAMILY MEDICINE | Facility: CLINIC | Age: 55
End: 2020-06-09

## 2020-06-29 ENCOUNTER — COMMUNICATION - HEALTHEAST (OUTPATIENT)
Dept: FAMILY MEDICINE | Facility: CLINIC | Age: 55
End: 2020-06-29

## 2020-06-30 ENCOUNTER — AMBULATORY - HEALTHEAST (OUTPATIENT)
Dept: MULTI SPECIALTY CLINIC | Facility: CLINIC | Age: 55
End: 2020-06-30

## 2020-06-30 LAB
ALBUMIN (URINE) MG/SPEC: <1.2 MG/DL (ref 0.2–10)
CREATININE (URINE): 36 MG/DL (ref 30–125)
HBA1C MFR BLD: 7.1 % (ref 4–6)

## 2020-07-01 ENCOUNTER — COMMUNICATION - HEALTHEAST (OUTPATIENT)
Dept: FAMILY MEDICINE | Facility: CLINIC | Age: 55
End: 2020-07-01

## 2020-08-15 ENCOUNTER — COMMUNICATION - HEALTHEAST (OUTPATIENT)
Dept: FAMILY MEDICINE | Facility: CLINIC | Age: 55
End: 2020-08-15

## 2020-08-15 DIAGNOSIS — E11.9 TYPE 2 DIABETES MELLITUS WITHOUT COMPLICATION, WITHOUT LONG-TERM CURRENT USE OF INSULIN (H): ICD-10-CM

## 2020-09-29 ENCOUNTER — COMMUNICATION - HEALTHEAST (OUTPATIENT)
Dept: FAMILY MEDICINE | Facility: CLINIC | Age: 55
End: 2020-09-29

## 2020-10-27 ENCOUNTER — COMMUNICATION - HEALTHEAST (OUTPATIENT)
Dept: FAMILY MEDICINE | Facility: CLINIC | Age: 55
End: 2020-10-27

## 2020-10-27 DIAGNOSIS — E11.9 TYPE 2 DIABETES MELLITUS WITHOUT COMPLICATION, WITHOUT LONG-TERM CURRENT USE OF INSULIN (H): ICD-10-CM

## 2020-11-29 ENCOUNTER — COMMUNICATION - HEALTHEAST (OUTPATIENT)
Dept: FAMILY MEDICINE | Facility: CLINIC | Age: 55
End: 2020-11-29

## 2020-12-01 ENCOUNTER — COMMUNICATION - HEALTHEAST (OUTPATIENT)
Dept: FAMILY MEDICINE | Facility: CLINIC | Age: 55
End: 2020-12-01

## 2020-12-01 DIAGNOSIS — E11.9 TYPE 2 DIABETES MELLITUS WITHOUT COMPLICATION, WITHOUT LONG-TERM CURRENT USE OF INSULIN (H): ICD-10-CM

## 2020-12-09 NOTE — RESULT ENCOUNTER NOTE
All the labs are relatively unremarkable. I think the abnormalities are related to your HIV. We can repeat them in 6 months or so to ensure stability but this is very reassuring.   Vania Marti   of Medicine   Hematology and medical Oncology   [facility]  
Additional Notes: Will send to [facility] for [employer] cash pay pricing
Detail Level: Detailed

## 2021-01-03 ENCOUNTER — HEALTH MAINTENANCE LETTER (OUTPATIENT)
Age: 56
End: 2021-01-03

## 2021-02-16 ENCOUNTER — COMMUNICATION - HEALTHEAST (OUTPATIENT)
Dept: CARDIOLOGY | Facility: CLINIC | Age: 56
End: 2021-02-16

## 2021-03-19 ENCOUNTER — COMMUNICATION - HEALTHEAST (OUTPATIENT)
Dept: FAMILY MEDICINE | Facility: CLINIC | Age: 56
End: 2021-03-19

## 2021-03-19 DIAGNOSIS — E11.9 TYPE 2 DIABETES MELLITUS WITHOUT COMPLICATION, WITHOUT LONG-TERM CURRENT USE OF INSULIN (H): ICD-10-CM

## 2021-03-22 ENCOUNTER — COMMUNICATION - HEALTHEAST (OUTPATIENT)
Dept: FAMILY MEDICINE | Facility: CLINIC | Age: 56
End: 2021-03-22

## 2021-04-25 ENCOUNTER — HEALTH MAINTENANCE LETTER (OUTPATIENT)
Age: 56
End: 2021-04-25

## 2021-05-04 ENCOUNTER — COMMUNICATION - HEALTHEAST (OUTPATIENT)
Dept: FAMILY MEDICINE | Facility: CLINIC | Age: 56
End: 2021-05-04

## 2021-05-04 DIAGNOSIS — E11.9 TYPE 2 DIABETES MELLITUS WITHOUT COMPLICATION, WITHOUT LONG-TERM CURRENT USE OF INSULIN (H): ICD-10-CM

## 2021-05-05 ENCOUNTER — COMMUNICATION - HEALTHEAST (OUTPATIENT)
Dept: FAMILY MEDICINE | Facility: CLINIC | Age: 56
End: 2021-05-05

## 2021-05-07 ENCOUNTER — AMBULATORY - HEALTHEAST (OUTPATIENT)
Dept: FAMILY MEDICINE | Facility: CLINIC | Age: 56
End: 2021-05-07

## 2021-05-07 ENCOUNTER — AMBULATORY - HEALTHEAST (OUTPATIENT)
Dept: LAB | Facility: CLINIC | Age: 56
End: 2021-05-07

## 2021-05-07 ENCOUNTER — AMBULATORY - HEALTHEAST (OUTPATIENT)
Dept: NURSING | Facility: CLINIC | Age: 56
End: 2021-05-07

## 2021-05-07 DIAGNOSIS — Z23 NEED FOR VACCINATION: ICD-10-CM

## 2021-05-07 DIAGNOSIS — E11.9 TYPE 2 DIABETES MELLITUS WITHOUT COMPLICATION, WITHOUT LONG-TERM CURRENT USE OF INSULIN (H): ICD-10-CM

## 2021-05-07 LAB
ALBUMIN SERPL-MCNC: 4.2 G/DL (ref 3.5–5)
ALP SERPL-CCNC: 53 U/L (ref 45–120)
ALT SERPL W P-5'-P-CCNC: 24 U/L (ref 0–45)
ANION GAP SERPL CALCULATED.3IONS-SCNC: 12 MMOL/L (ref 5–18)
AST SERPL W P-5'-P-CCNC: 20 U/L (ref 0–40)
BILIRUB SERPL-MCNC: 0.3 MG/DL (ref 0–1)
BUN SERPL-MCNC: 29 MG/DL (ref 8–22)
CALCIUM SERPL-MCNC: 9.7 MG/DL (ref 8.5–10.5)
CHLORIDE BLD-SCNC: 104 MMOL/L (ref 98–107)
CHOLEST SERPL-MCNC: 204 MG/DL
CO2 SERPL-SCNC: 23 MMOL/L (ref 22–31)
CREAT SERPL-MCNC: 1.43 MG/DL (ref 0.7–1.3)
FASTING STATUS PATIENT QL REPORTED: YES
GFR SERPL CREATININE-BSD FRML MDRD: 51 ML/MIN/1.73M2
GLUCOSE BLD-MCNC: 211 MG/DL (ref 70–125)
HBA1C MFR BLD: 7.9 %
HDLC SERPL-MCNC: 35 MG/DL
LDLC SERPL CALC-MCNC: 84 MG/DL
LDLC SERPL CALC-MCNC: ABNORMAL MG/DL
POTASSIUM BLD-SCNC: 4.3 MMOL/L (ref 3.5–5)
PROT SERPL-MCNC: 8.3 G/DL (ref 6–8)
SODIUM SERPL-SCNC: 139 MMOL/L (ref 136–145)
TRIGL SERPL-MCNC: 920 MG/DL

## 2021-05-17 ENCOUNTER — RECORDS - HEALTHEAST (OUTPATIENT)
Dept: FAMILY MEDICINE | Facility: CLINIC | Age: 56
End: 2021-05-17

## 2021-05-19 ENCOUNTER — RECORDS - HEALTHEAST (OUTPATIENT)
Dept: ADMINISTRATIVE | Facility: OTHER | Age: 56
End: 2021-05-19

## 2021-05-27 NOTE — PROGRESS NOTES
Subjective:  54 y.o. male with concerns of follow up on DMII.  Thinks sugars have been better, maybe not perfect.  Fasting 140 to 160.  Excess thirst and urination have been improved.  Taking Jardiance and glimepiride.  Has not had any symptoms of hypoglycemia.  Feels tolerating sulfonylurea well where he had not tolerated another one as well in the past.    Worried about pain at tip of penis.  Dull ache.  Worse after intercourse/ejaculation.  Always there, sometime worse, sometimes better.  No other symptoms to suggest urine infection or prostatitis-  No dysuria, frequency, discharge, rash, blood in urine or semen.    Also, having more numb feeling in right leg as compared to left.  Worse with walking.  Gets better if he stops and rests.  Chiropractor tried to adjust his hips/pelvis.  Didn't help much.  Feels somewhat different than neuropathy, which is more numb/tingling pain.  Says ABIs were done a while ago, and were normal.  Cholesterol is a bit of a mess with total 308, , , HDL 43.  Has been hesitant to use a statin due to potential for side effects.  Takes fenofibrate.  Started when on HIV meds that were causing marked elevation of TGs    Outpatient Medications Prior to Visit   Medication Sig Dispense Refill     aspirin 81 MG EC tablet Take 1 tablet (81 mg total) by mouth daily. 365 tablet 0     blood glucose test (ACCU-CHEK MARY PLUS TEST STRP) strips Use 1 strip 3 times a day 100 strip 11     BLOOD SUGAR DIAGNOSTIC (ACCU-CHEK MARY MISC) In Vitro Strip    Use 1 strip 3 times daily       blood-glucose meter (ACCU-CHEK MARY PLUS METER) Misc Use as directed. 1 each 0     darunavir-cobicistat (PREZCOBIX) 800-150 mg-mg Tab tablet Take 1 tablet by mouth daily.       dolutegravir (TIVICAY) 50 mg Tab tablet Take 50 mg by mouth daily.       empagliflozin (JARDIANCE) 10 mg Tab Take 2 tablets (20 mg total) by mouth daily. 60 tablet 11     emtricitabine-tenofovir alafenamide (DESCOVY) 200-25 mg Tab  tablet Take 1 tablet by mouth daily.       fenofibrate (TRICOR) 145 MG tablet Take 145 mg by mouth at bedtime.        glimepiride (AMARYL) 2 MG tablet TAKE 1 TABLET(2 MG) BY MOUTH EVERY MORNING 30 tablet 10     HYDROcodone-acetaminophen 5-325 mg per tablet Take 1 tablet by mouth every 4 (four) hours as needed for pain. 20 tablet 0     LANCETS (ACCU-CHEK MULTICLIX LANCET MISC) Use As Directed.       lancing device with lancets (ACCU-CHEK MULTICLIX LANCET) Kit Use as directed 100 each 11     lisinopril (PRINIVIL,ZESTRIL) 5 MG tablet Take 5 mg by mouth at bedtime.        multivitamin therapeutic tablet Take 1 tablet by mouth daily.       omeprazole (PRILOSEC) 20 MG capsule Take 20 mg by mouth daily before breakfast.        terazosin (HYTRIN) 2 MG capsule Take 2 mg by mouth bedtime.       valACYclovir (VALTREX) 500 MG tablet Take 500 mg by mouth daily.        No facility-administered medications prior to visit.       Social History     Tobacco Use   Smoking Status Never Smoker   Smokeless Tobacco Never Used      Objective:  /70 (Patient Site: Left Arm, Patient Position: Sitting, Cuff Size: Adult Regular)   Pulse 72   Resp 12   Wt 175 lb (79.4 kg)   BMI 25.11 kg/m    GENERAL: alert, not distressed  CHEST: clear, no rales, rhonchi, or wheezes  CARDIAC: regular without murmur, gallop, or rub  He declined ROSALIA.    FOOT EXAM:  DP present and PT pulse not fond  Monofilament testing normal  Nails normal.  Hair growth absent.    Ankle Brachial Indices:  Left 1.11  Right 0.98    Assessment and Plan:   1. Type 2 diabetes mellitus without complication, without long-term current use of insulin (H)  Expecting control to be better, perhaps not at goal yet.  Concern for side effects from Jardiance.  Has room to increase glimepiride.  Med adjustment when lab results available.  - Glycosylated Hemoglobin A1c  - Lipid Cascade FASTING  - Basic Metabolic Panel    2. Penile pain  Discussed we should rule out infection.  Risk for  "yeast increased from SGLT-2.  Chronic prostatitis discussed with risk from HIV.  Would repeat GC/CT studies.  If we find nothing, could trial off Jardiance.  If still not better, see urology.  - Urinalysis  - Chlamydia trachomatis & Neisseria gonorrhoeae, Amplified Detection     3. Right leg symptoms  I do wonder about claudication.  ABIs are normal but there does seem to be a decrease on the right and this is near \"mild obstruction\" range.  Risk for vascular disease from HIV/meds and cholesterol profile.  Has EMG of all four extremities coming up in May to evaluate neuropathy better.  Hematologist feels that monoclonal protein small spike was likely from HIV/meds.    Will contact him with lab results.  "

## 2021-05-30 VITALS — BODY MASS INDEX: 25.77 KG/M2 | WEIGHT: 180 LBS | HEIGHT: 70 IN

## 2021-05-31 VITALS — WEIGHT: 181 LBS | HEIGHT: 70 IN | BODY MASS INDEX: 25.91 KG/M2

## 2021-05-31 VITALS — BODY MASS INDEX: 25.91 KG/M2 | WEIGHT: 181 LBS | HEIGHT: 70 IN

## 2021-05-31 VITALS — WEIGHT: 180.1 LBS | BODY MASS INDEX: 25.78 KG/M2 | HEIGHT: 70 IN

## 2021-05-31 NOTE — TELEPHONE ENCOUNTER
----- Message from Jose Damon CMA sent at 8/26/2019  7:38 AM CDT -----  Please call and help make appointment for patient to come in to be seen.      ----- Message -----  From: Cortes Astudillo MD  Sent: 8/25/2019   1:26 PM  To: Cortes Astudillo Care Team Pool    Call:  Due for labs for diabetes.

## 2021-06-01 VITALS — HEIGHT: 70 IN | WEIGHT: 181 LBS | BODY MASS INDEX: 25.91 KG/M2

## 2021-06-01 VITALS — HEIGHT: 70 IN | WEIGHT: 178 LBS | BODY MASS INDEX: 25.48 KG/M2

## 2021-06-02 VITALS — BODY MASS INDEX: 25.77 KG/M2 | HEIGHT: 70 IN | WEIGHT: 180 LBS

## 2021-06-02 VITALS — BODY MASS INDEX: 24.68 KG/M2 | WEIGHT: 172 LBS

## 2021-06-02 NOTE — TELEPHONE ENCOUNTER
RN cannot approve Refill Request    RN can NOT refill this medication med is not covered by policy/route to provider. Last office visit: Visit date not found Last Physical: Visit date not found Last MTM visit: Visit date not found Last visit same specialty: 4/15/2019 Crotes Astudillo MD.  Next visit within 3 mo: Visit date not found  Next physical within 3 mo: Visit date not found      Shana Stovall, [facility] Triage/Med Refill 10/31/2019    Requested Prescriptions   Pending Prescriptions Disp Refills     JARDIANCE 10 mg Tab [Pharmacy Med Name: JARDIANCE 10MG TABLETS] 60 tablet 0     Sig: TAKE 2 TABLETS(20 MG) BY MOUTH DAILY       There is no refill protocol information for this order

## 2021-06-03 VITALS
SYSTOLIC BLOOD PRESSURE: 110 MMHG | HEIGHT: 70 IN | BODY MASS INDEX: 24.77 KG/M2 | DIASTOLIC BLOOD PRESSURE: 64 MMHG | HEART RATE: 60 BPM | WEIGHT: 173 LBS

## 2021-06-03 VITALS — WEIGHT: 175 LBS | BODY MASS INDEX: 25.11 KG/M2

## 2021-06-03 NOTE — TELEPHONE ENCOUNTER
RN cannot approve Refill Request    RN can NOT refill this medication med is not covered by policy/route to provider. Last office visit: 11/11/2019 Cortes Astudillo MD Last Physical: Visit date not found Last MTM visit: Visit date not found Last visit same specialty: 11/11/2019 Cortes Astudillo MD.  Next visit within 3 mo: Visit date not found  Next physical within 3 mo: Visit date not found      Shana Stovall, [facility] Triage/Med Refill 11/26/2019    Requested Prescriptions   Pending Prescriptions Disp Refills     JARDIANCE 10 mg Tab [Pharmacy Med Name: JARDIANCE 10MG TABLETS] 60 tablet 0     Sig: TAKE 2 TABLETS BY MOUTH EVERY DAY       There is no refill protocol information for this order

## 2021-06-03 NOTE — TELEPHONE ENCOUNTER
Refill task was routed to incorrect pool. Patient ask for refill 10/31/2019. Please review med refill for further information.

## 2021-06-03 NOTE — PROGRESS NOTES
Subjective:  54 y.o. male with concerns of DMII follow up.  Meter malfunctioning.  It's old.  Hasn't checked much.    Tolerating statin.  No muscle aches.    Just back from cruise to [place].  Walking a bit more with that trip.    Outpatient Medications Prior to Visit   Medication Sig Dispense Refill     aspirin 81 MG EC tablet Take 1 tablet (81 mg total) by mouth daily. 365 tablet 0     atorvastatin (LIPITOR) 10 MG tablet Take 1 tablet (10 mg total) by mouth at bedtime. 90 tablet 0     blood glucose test (ACCU-CHEK MARY PLUS TEST STRP) strips Use 1 strip 3 times a day 100 strip 11     BLOOD SUGAR DIAGNOSTIC (ACCU-CHEK MARY MISC) In Vitro Strip    Use 1 strip 3 times daily       blood-glucose meter (ACCU-CHEK AMRY PLUS METER) Misc Use as directed. 1 each 0     darunavir-cobicistat (PREZCOBIX) 800-150 mg-mg Tab tablet Take 1 tablet by mouth daily.       dolutegravir (TIVICAY) 50 mg Tab tablet Take 50 mg by mouth daily.       emtricitabine-tenofovir alafenamide (DESCOVY) 200-25 mg Tab tablet Take 1 tablet by mouth daily.       fenofibrate (TRICOR) 145 MG tablet Take 145 mg by mouth at bedtime.        glimepiride (AMARYL) 2 MG tablet TAKE 1 TABLET(2 MG) BY MOUTH EVERY MORNING 30 tablet 10     HYDROcodone-acetaminophen 5-325 mg per tablet Take 1 tablet by mouth every 4 (four) hours as needed for pain. 20 tablet 0     JARDIANCE 10 mg Tab TAKE 2 TABLETS(20 MG) BY MOUTH DAILY 60 tablet 0     LANCETS (ACCU-CHEK MULTICLIX LANCET [facility]) Use As Directed.       lancing device with lancets (ACCU-CHEK MULTICLIX LANCET) Kit Use as directed 100 each 11     lisinopril (PRINIVIL,ZESTRIL) 5 MG tablet Take 5 mg by mouth at bedtime.        multivitamin therapeutic tablet Take 1 tablet by mouth daily.       omeprazole (PRILOSEC) 20 MG capsule Take 20 mg by mouth daily before breakfast.        terazosin (HYTRIN) 2 MG capsule Take 2 mg by mouth bedtime.       valACYclovir (VALTREX) 500 MG tablet Take 500 mg by mouth daily.        No  "facility-administered medications prior to visit.       Social History     Tobacco Use   Smoking Status Never Smoker   Smokeless Tobacco Never Used      Objective:  /64 (Patient Site: Left Arm, Patient Position: Sitting, Cuff Size: Adult Regular)   Pulse 60   Ht 5' 9.75\" (1.772 m)   Wt 173 lb (78.5 kg)   BMI 25.00 kg/m    GENERAL: alert, not distressed  CHEST: clear, no rales, rhonchi, or wheezes  CARDIAC: regular without murmur, gallop, or rub    FOOT EXAM:  DP and PT pulses are normal.  Monofilament testing normal  Nails normal.  Hair growth normal.    Recent Results (from the past 24 hour(s))   Glycosylated Hemoglobin A1c   Result Value Ref Range    Hemoglobin A1c 9.0 (H) 3.5 - 6.0 %       Assessment and Plan:   1. Type 2 diabetes mellitus without complication, without long-term current use of insulin (H)  Rec'd to get eye exam.  Will need to adjust meds-increase glimiperide to 4 mg daily.  He didn't want to increase to two times a day     - Glycosylated Hemoglobin A1c  - Lipid Cascade FASTING  - Basic Metabolic Panel    2. Need for influenza vaccination  - Influenza, Recombinant, Inj, Quadrivalent, PF, 18+YRS    3. Need for shingles vaccine  - Varicella Zoster, Recombinant Vaccine IM   "

## 2021-06-04 NOTE — TELEPHONE ENCOUNTER
Refill Approved    Rx renewed per Medication Renewal Policy. Medication was last renewed on 12/20/18.    Paula Butler, [facility] Triage/Med Refill 1/5/2020     Requested Prescriptions   Pending Prescriptions Disp Refills     aspirin 81 MG EC tablet [Pharmacy Med Name: ASPIRIN 81MG EC LOW DOSE TABLETS] 365 tablet 0     Sig: TAKE 1 TABLET BY MOUTH DAILY       Aspirin/Dipyridamole Refill Protocol Passed - 1/3/2020  4:37 PM        Passed - PCP or prescribing provider visit in past 12 months       Last office visit with prescriber/PCP: 11/11/2019 Cortes Astudillo MD OR same dept: 11/11/2019 Cortes Astudillo MD OR same specialty: 11/11/2019 Cortes Astudillo MD  Last physical: Visit date not found Last MTM visit: Visit date not found    Next appt within 3 mo: Visit date not found Next physical within 3 mo: Visit date not found  Prescriber OR PCP: Cortes Astudillo MD  Last diagnosis associated with med order: 1. Type 2 diabetes mellitus without complication, without long-term current use of insulin (H)  - aspirin 81 MG EC tablet [Pharmacy Med Name: ASPIRIN 81MG EC LOW DOSE TABLETS]; TAKE 1 TABLET BY MOUTH DAILY  Dispense: 365 tablet; Refill: 0    If protocol passes may refill for 6 months if within 3 months of last provider visit (or a total of 9 months).

## 2021-06-05 ENCOUNTER — RECORDS - HEALTHEAST (OUTPATIENT)
Dept: INTENSIVE CARE | Facility: CLINIC | Age: 56
End: 2021-06-05

## 2021-06-05 DIAGNOSIS — J98.6 DISORDER OF DIAPHRAGM: ICD-10-CM

## 2021-06-05 DIAGNOSIS — R06.09 OTHER DYSPNEA AND RESPIRATORY ABNORMALITY: ICD-10-CM

## 2021-06-05 DIAGNOSIS — R09.89 OTHER DYSPNEA AND RESPIRATORY ABNORMALITY: ICD-10-CM

## 2021-06-05 NOTE — TELEPHONE ENCOUNTER
Second message to the first one. Okay to start PA if you wish to have patient continue the current dose.

## 2021-06-08 NOTE — TELEPHONE ENCOUNTER
Called to set up lab appt for Diabetes labs with a f/u virtual appointment after labs were back . Left voice mail for patient to call clinic back . Okay to relay message

## 2021-06-08 NOTE — TELEPHONE ENCOUNTER
----- Message from Cortes Astudillo MD sent at 6/8/2020 11:04 AM CDT -----  Due for Diabetes labs.  I would recommend lab visit, followed by virtual clinic visit.  In person visit is also an option.  <<please route this message back to me when scheduled, and I will enter lab orders>>

## 2021-06-09 NOTE — PROGRESS NOTES
Subjective:   Pb comes in for consultation of his blood sugars.  He is on metformin 500 mg twice a day.  He recently had lab work done which showed a glycosylated hemoglobin of 8.2%.  He denies any polyuria or polydipsia.  He thinks he's been eating a pretty good diet.  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  He is not very aerobically active.  He does not think he's gained weight much recently.  He does think he over ate during the holidays but now is back on a good diet.  He's had no recent infections.      Objective:  Examination is not done today as this is consultation only.      Assessment:  1.  Diabetes mellitus which is not in good control      Plan:  I had a long discussion with patient today about his diabetes.  We have decided to increase his metformin to 2 tablets twice a day.  A new prescription was sent to his pharmacy.  He will start checking sugars more regularly.  I would like him to check at different times throughout the day to get a number of readings in the morning and evening.  He'll try to become more aerobically active as well.  He will try to lose some weight.  He will limit calories per meal.  I would like to see him back here in 3 months to recheck his glycosylated hemoglobin.  Greater than 15 minutes was spent with the patient today.  Greater than 50% of time was in counseling on diabetic control

## 2021-06-09 NOTE — TELEPHONE ENCOUNTER
Yes saw his labs from [facility].  He is up to date, so he doesn't need to come in.

## 2021-06-09 NOTE — TELEPHONE ENCOUNTER
Left message #3 at [phone]. No letter was sent out. Sending letter out and postponing task out to two weeks and will check to see if patient made an appointment. If no appointment is made when task comes back, we are completing the task.

## 2021-06-09 NOTE — TELEPHONE ENCOUNTER
Called patient to schedule lab appt, patient declined, stated that he had labs done at [facility] last week and shared results with PCP. Does patient need to schedule appt with PCP, please advise?

## 2021-06-13 NOTE — TELEPHONE ENCOUNTER
RN cannot approve Refill Request    RN can NOT refill this medication med is not covered by policy/route to provider. Last office visit: 11/11/2019 Cortes Astudillo MD Last Physical: Visit date not found Last MTM visit: Visit date not found Last visit same specialty: 11/11/2019 Cortes Astudillo MD.  Next visit within 3 mo: Visit date not found  Next physical within 3 mo: Visit date not found      Erma Panda, [facility] Triage/Med Refill 12/3/2020    Requested Prescriptions   Pending Prescriptions Disp Refills     JARDIANCE 25 mg Tab [Pharmacy Med Name: JARDIANCE 25MG TABLETS] 90 tablet 3     Sig: TAKE 1 TABLET BY MOUTH EVERY DAY       There is no refill protocol information for this order

## 2021-06-14 NOTE — PROGRESS NOTES
Subjective:   Pb comes in today for check of a mass in the abdomen.  He has has been getting slightly larger.  It is right around the umbilicus.  He denies nausea or vomiting.  He denies any significant bowel changes such as constipation or diarrhea.  He has had symptoms for a few months now.  He does have a history of an umbilical hernia.  This was repaired in the past.      Objective:  Abdomen: There is a small umbilical hernia present just to the right superior area of the umbilicus.  The hernia is reducible.  It is mildly tender.  No rebound, guarding or rigidity present.  Bowel sounds are active throughout.  No distention of the abdomen noted.  There is a well-healed scar noted on the inferior surface of the umbilicus.      Assessment:  1.  Umbilical hernia      Plan:  Patient will be referred to surgery for repair of this as it is getting larger.  Follow-up here as needed.  Activity at this point in time will be as tolerated.

## 2021-06-15 NOTE — PROGRESS NOTES
HPI: Pt is here for follow up robotic umbilical hernia repair with Dr. Prescott on 1/26/2018.   he is doing well.  Pain is well controlled.  No difficulties with the surgical wound/wounds.  he is eating well and denies fever and chills.         /87 (Patient Site: Right Arm, Patient Position: Sitting, Cuff Size: Adult Regular)  Pulse 74  SpO2 97%    EXAM:  GENERAL:Appears well  ABDOMEN:  Soft, +BS  SURGICAL WOUNDS:  Incisions healing well, no enduration or drainage.      Assessment/Plan: Doing well after surgery and should follow up as needed.    Sun Pimentel , [facility]

## 2021-06-15 NOTE — PROGRESS NOTES
Received a letter from [facility] today regarding the surgery performed on 1/26/18. They are requesting records to determine if the service is medically necessary. I will fax office notes from Dr. Prescott 1/8/18 and the PCP Dr. Gil 12/13/17.     Fax #: [phone]  ATTN: AYAKA DAVIS  Customer ID#[id number]  Authorization: [id number]    Patient is aware that [facility] and Dr. Prescott are out of network, but does have out of network benefits.     Arabella Eid [facility]  Surgery Scheduling  [phone]

## 2021-06-15 NOTE — PROGRESS NOTES
HPI:  Pb Langston is a 52 y.o. male who was referred to me by Sukhwinder Gil MD for a ventral hernia.  He presents with complaints of a bulge in the periumbilical region with increasing mild dicomfort.   He has noted this for the past several years.  He denies any nausea, vomiting, fevers, chills, bloody bowel movements or any other complaints at this time. Previous surgeries include a previous umbilical hernia which has recurred over the past several years.    Allergies:Cephalosporins; Keflex [cephalexin]; Oxycodone; Penicillins; and Sulfa (sulfonamide antibiotics)    Past Medical History:   Diagnosis Date     Diabetes type 2, controlled      HIV positive      Hyperlipidemia      Hypertension        History reviewed. No pertinent surgical history.    CURRENT MEDS:  Current Outpatient Prescriptions   Medication Sig Dispense Refill     abacavir (ZIAGEN) 300 mg tablet Take 300 mg by mouth 2 (two) times a day.       atovaquone-proguanil (MALARONE) 250-100 mg Tab Take 1 tablet by mouth daily with breakfast. 28 tablet 0     BLOOD SUGAR DIAGNOSTIC (ACCU-CHEK MARY MISC) In Vitro Strip    Use 1 strip 3 times daily       blood sugar diagnostic (ACCU-CHEK MARY PLUS TEST STRP) Strp Use 1 strip 3 times a day 100 strip 11     blood-glucose meter (ACCU-CHEK MARY PLUS METER) Misc Use as directed. 1 each 0     darunavir (PREZISTA) 400 MG tablet Take 400 mg by mouth 2 (two) times a day with meals.       darunavir-cobicistat (PREZCOBIX) 800-150 mg-mg Tab tablet Take 1 tablet by mouth daily.       dolutegravir (TIVICAY) 50 mg Tab tablet Take 50 mg by mouth daily.       fenofibrate (TRICOR) 145 MG tablet Take 145 mg by mouth daily.       LANCETS (ACCU-CHEK MULTICLIX LANCET MISC) Use As Directed.       lancing device with lancets (ACCU-CHEK MULTICLIX LANCET) Kit Use as directed 100 each 11     lisinopril (PRINIVIL,ZESTRIL) 5 MG tablet Take 5 mg by mouth daily.       metFORMIN (GLUCOPHAGE) 500 MG tablet Take 2 tablets (1,000 mg  "total) by mouth 2 (two) times a day with meals. 120 tablet 11     omeprazole (PRILOSEC) 20 MG capsule Take 20 mg by mouth daily.       PREZISTA 800 mg Tab TK 1 T  PO D  3     raltegravir (ISENTRESS) 400 mg tablet Take 400 mg by mouth 2 (two) times a day.       ritonavir (NORVIR) 100 mg capsule Take by mouth 2 (two) times a day.       terazosin (HYTRIN) 2 MG capsule Take 2 mg by mouth bedtime.       valACYclovir (VALTREX) 500 MG tablet Take 500 mg by mouth 2 (two) times a day.       No current facility-administered medications for this visit.        History reviewed. No pertinent family history.     reports that he has never smoked. He has never used smokeless tobacco.    Review of Systems -   The 12 point review of systems  is within normal limits except for as mentioned above in the HPI.  General ROS: No complaints or constitutional symptoms  Ophthalmic ROS: No complaints of visual changes  Skin: No complaints or symptoms   Endocrine: No complaints or symptoms  Hematologic/Lymphatic: No symptoms or complaints  Psychiatric: No symptoms or complaints  Respiratory ROS: no cough, shortness of breath, or wheezing  Cardiovascular ROS: no chest pain or dyspnea on exertion  Gastrointestinal ROS: As per HPI  Genito-Urinary ROS: no dysuria, trouble voiding, or hematuria  Musculoskeletal ROS: no joint or muscle pain  Neurological ROS: no TIA or stroke symptoms      /74 (Patient Site: Right Arm, Patient Position: Sitting, Cuff Size: Adult Regular)  Pulse 69  Ht 5' 10\" (1.778 m)  Wt 180 lb 1.6 oz (81.7 kg)  SpO2 96%  BMI 25.84 kg/m2  Body mass index is 25.84 kg/(m^2).    EXAM:  GENERAL: Well developed male  HEENT: Extra ocular muscles intact, pupils are round and reactive, sclera is anicteric,   NECK:  No obvious masses or deformities  CARDIAC: RRR w/out murmur   CHEST/LUNG: Clear to auscultation bilaterally  ABDOMEN: Soft, reducible recurrent umbilical hernia measuring approximately 1.5 cm on digital exam  NEURO: No " obvious defects noted.  EXT: No edema, no obvious deformities or any other abnormalities        Assessment/Plan:    Pb Langston is a 52 y.o. male with a recurrent umbilical hernia.  The pathophysiology of these hernias was discussed as were the surgical and non-operative management strategies.      The risks of surgery were discussed which include, but are not limited to, bleeding, infection, recurrent hernia, chronic pain, poor cosmesis, blood clots, stroke, heart attack and death.  Additionally, the risks of observation were discussed which include, but are not limited to, enlargement of the hernia, incarceration, strangulation, pain and death.  Surgical options including open, laparoscopic and robotic hernia repair were discussed in detail.      He understands everything which was discussed and has consented to proceed with surgery.  He would like to proceed with the more durable repair which would be closure of the defect as well as mesh placement and a robotic manner.  We will therefore schedule robotic repair of the hernia accordingly.      Andrés Prescott D.O. [facility]  [phone]  [facility] Department of Surgery

## 2021-06-15 NOTE — ANESTHESIA POSTPROCEDURE EVALUATION
Patient: Pb Langston  ROBOTIC UMBILICAL HERNIA REPAIR  Anesthesia type: MAC    Patient location: PACU  Last vitals:   Vitals:    01/26/18 1430   BP: (!) 145/93   Pulse: 64   Resp: 18   Temp:    SpO2: 100%     Post vital signs: stable  Level of consciousness: alert and conversant  Post-anesthesia pain: pain controlled  Post-anesthesia nausea and vomiting: no  Pulmonary: using his CPAP  Cardiovascular: stable and blood pressure at baseline  Hydration: adequate  Anesthetic events: no    QCDR Measures:  ASA# 11 - Amy-op Cardiac Arrest: ASA11B - Patient did NOT experience unanticipated cardiac arrest  ASA# 12 - Amy-op Mortality Rate: ASA12B - Patient did NOT die  ASA# 13 - PACU Re-Intubation Rate: ASA13B - Patient did NOT require a new airway mgmt  ASA# 10 - Composite Anes Safety: ASA10A - No serious adverse event    Additional Notes:

## 2021-06-15 NOTE — PROGRESS NOTES
Assessment/Plan:      Visit for Preoperative Exam.   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 Postoperative pain to be managed by surgeon during post-operative [employer] timeframe, typically 30-60 days for major surgery, and less for others. Labs will be done as indicated. Above recommendations were reviewed with the patient. Copy of the pre-op was given to the patient to bring along on the day of surgery. Follow up as needed. Proceed with proposed surgery without additional clinical clarifications. No Cardiology consultation or non-invasive testing. Low Risk Surgery. No active cardiac conditions.  BMP and hemogram are done today.  Results will be faxed to surgery prior to this procedure.  There are no contraindications to this procedure per my examination today.    Subjective:   Pb has noticed a hernia in the umbilical area over the past few months.  This pain slightly.  This is a recurrent hernia.  He had a mesh placed years ago.  He now is having this repaired.    Scheduled Procedure: Robotic umbilical hernia repair  Surgery Date: 1/26/2018  Surgery Location: [facility]  Surgeon: Dr. Prescott    Current Outpatient Prescriptions   Medication Sig Dispense Refill     abacavir (ZIAGEN) 300 mg tablet Take 300 mg by mouth 2 (two) times a day.       atovaquone-proguanil (MALARONE) 250-100 mg Tab Take 1 tablet by mouth daily with breakfast. 28 tablet 0     BLOOD SUGAR DIAGNOSTIC (ACCU-CHEK MARY MISC) In Vitro Strip    Use 1 strip 3 times daily       blood sugar diagnostic (ACCU-CHEK MARY PLUS TEST STRP) Strp Use 1 strip 3 times a day 100 strip 11     blood-glucose meter (ACCU-CHEK MARY PLUS METER) Misc Use as directed. 1 each 0     darunavir  "(PREZISTA) 400 MG tablet Take 400 mg by mouth 2 (two) times a day with meals.       darunavir-cobicistat (PREZCOBIX) 800-150 mg-mg Tab tablet Take 1 tablet by mouth daily.       dolutegravir (TIVICAY) 50 mg Tab tablet Take 50 mg by mouth daily.       fenofibrate (TRICOR) 145 MG tablet Take 145 mg by mouth daily.       LANCETS (ACCU-CHEK MULTICLIX LANCET MISC) Use As Directed.       lancing device with lancets (ACCU-CHEK MULTICLIX LANCET) Kit Use as directed 100 each 11     lisinopril (PRINIVIL,ZESTRIL) 5 MG tablet Take 5 mg by mouth daily.       metFORMIN (GLUCOPHAGE) 500 MG tablet Take 2 tablets (1,000 mg total) by mouth 2 (two) times a day with meals. 120 tablet 11     omeprazole (PRILOSEC) 20 MG capsule Take 20 mg by mouth daily.       PREZISTA 800 mg Tab TK 1 T  PO D  3     raltegravir (ISENTRESS) 400 mg tablet Take 400 mg by mouth 2 (two) times a day.       ritonavir (NORVIR) 100 mg capsule Take by mouth 2 (two) times a day.       terazosin (HYTRIN) 2 MG capsule Take 2 mg by mouth bedtime.       valACYclovir (VALTREX) 500 MG tablet Take 500 mg by mouth 2 (two) times a day.       No current facility-administered medications for this visit.        Allergies   Allergen Reactions     Cephalosporins      Severe hives with keflex     Keflex [Cephalexin]      \"severe hives\"     Oxycodone Nausea And Vomiting     Penicillins      rash     Sulfa (Sulfonamide Antibiotics)      rash       Immunization History   Administered Date(s) Administered     Influenza T9c1-79, 01/06/2010     Influenza, Seasonal, Inj PF IIV3 09/05/2013     Influenza, inj, historic,unspecified 11/17/2015     Influenza, seasonal,quad inj 36+ mos 12/13/2017     Influenza,seasonal quad, PF 10/23/2014     Pneumo Conj 13-V (2010&after) 11/17/2015     Pneumo Polysac 23-V 03/08/2007, 03/08/2010, 06/05/2012, 11/26/2013     Td,adult,historic,unspecified 10/29/2007     Tdap 10/29/2007, 12/22/2011     Typhoid, Inj, Inactive 03/21/2016       Patient Active " Problem List   Diagnosis     Cholelithiasis     Dermatitis     Fatigue     Limb Pain     Diaphragmatic Disorder     Diarrhea     Type 2 diabetes mellitus without complication     Nephrolithiasis     Obstructive Sleep Apnea     Difficulty Breathing (Dyspnea)     HIV Infection     Hyperlipidemia     Sandoval's Esophagus       Past Medical History:   Diagnosis Date     Diabetes type 2, controlled      HIV positive      Hyperlipidemia      Hypertension        Social History     Social History     Marital status:      Spouse name: N/A     Number of children: N/A     Years of education: N/A     Occupational History     Not on file.     Social History Main Topics     Smoking status: Never Smoker     Smokeless tobacco: Never Used     Alcohol use Yes      Comment: Occasionally     Drug use: No     Sexual activity: Not on file     Other Topics Concern     Not on file     Social History Narrative       No past surgical history on file.    History of Present Illness  Recent Health  Fever: no  Chills: no  Fatigue: no  Chest Pain: no  Cough: no  Dyspnea: no  Urinary Frequency: no  Nausea: no  Vomiting: no  Diarrhea: no  Abdominal Pain: no  Easy Bruising: no  Lower Extremity Swelling: no    Pertinent History  Prior Anesthesia: yes  Previous Anesthesia Reaction:  no  Diabetes: no  Cardiovascular Disease: no  Pulmonary Disease: yes, history of paralyzed diaphragm  Renal Disease: no  GI Disease: no  Sleep Apnea: yes  Thromboembolic Problems: no  Clotting Disorder: no  Bleeding Disorder: no    Social history of there are no concerns regarding care after surgery    After surgery, the patient plans to recover at home with family.    Review of Systems  Review of Systems   Constitutional: Negative.  Negative for fatigue and fever.   HENT: Negative.  Negative for congestion.    Eyes: Negative.    Respiratory: Negative.  Negative for cough and shortness of breath.    Cardiovascular: Negative.  Negative for chest pain.  "  Gastrointestinal:        Complains of mass around the umbilicus consistent with a hernia.  This occasionally is tender.   Endocrine: Negative.    Genitourinary: Negative.  Negative for dysuria.   Musculoskeletal: Negative.    Skin: Negative.    Allergic/Immunologic: Negative.    Neurological: Negative.    Hematological: Negative.    Psychiatric/Behavioral: Negative.              Objective:         Vitals:    01/10/18 1559   BP: 106/80   Pulse: 68   Resp: 16   Temp: 97.6  F (36.4  C)   TempSrc: Oral   Weight: 181 lb (82.1 kg)   Height: 5' 10\" (1.778 m)       Physical Exam:  Physical Exam   Constitutional: He is oriented to person, place, and time. He appears well-developed and well-nourished. No distress.   HENT:   Right Ear: External ear normal.   Left Ear: External ear normal.   Nose: Nose normal.   Mouth/Throat: Oropharynx is clear and moist.   Eyes: EOM are normal. Pupils are equal, round, and reactive to light.   Neck: Normal range of motion. Neck supple. No JVD present. No thyromegaly present.   Cardiovascular: Normal rate, regular rhythm and normal heart sounds.    No murmur heard.  Pulmonary/Chest: Effort normal and breath sounds normal. No respiratory distress.   Abdominal: Soft. Bowel sounds are normal.   There is a small umbilical hernia.  This is reducible.  No rebound, guarding or rigidity present.  No other masses noted.  No hepatomegaly present.   Musculoskeletal: Normal range of motion. He exhibits no edema or tenderness.   Lymphadenopathy:     He has no cervical adenopathy.   Neurological: He is alert and oriented to person, place, and time. He has normal reflexes. No cranial nerve deficit.   Skin: Skin is warm and dry. No rash noted.   Psychiatric: He has a normal mood and affect.          "

## 2021-06-15 NOTE — ANESTHESIA PREPROCEDURE EVALUATION
Anesthesia Evaluation      Patient summary reviewed   History of anesthetic complications     Airway   Mallampati: I  Neck ROM: full   Pulmonary - normal exam   (+) sleep apnea on CPAP, ,   (-) shortness of breath                         Cardiovascular - normal exam  Exercise tolerance: > or = 4 METS  (+) hypertension, ,      Neuro/Psych - negative ROS   (-) no neuromuscular disease    Endo/Other    (+) diabetes mellitus type 2 poorly controlled,      GI/Hepatic/Renal    (+) GERD well controlled,     (-) renal disease     Other findings: Sometimes has nausea after anesthesia. HIV positive.  Long-standing paralysis of R diaphragm, etiology not known.  Denies nephrolithiasis.  CHAMBERS.  Sandoval's esophagus.  HgA1c 9.1 on 1/15/18. Hg 15.1, K 4.0, GFR>60.      Dental - normal exam                        Anesthesia Plan  Planned anesthetic: MAC    ASA 3     Anesthetic plan and risks discussed with: patient    Post-op plan: routine recovery

## 2021-06-15 NOTE — ANESTHESIA CARE TRANSFER NOTE
Last vitals:   Vitals:    01/26/18 1415   BP: 134/79   Pulse: 66   Resp: 17   Temp: 35.9  C (96.6  F)   SpO2: 96%     Patient's level of consciousness is drowsy  Spontaneous respirations: yes  Maintains airway independently: yes  Dentition unchanged: yes  Oropharynx: oropharynx clear of all foreign objects    QCDR Measures:  ASA# 20 - Surgical Safety Checklist: WHO surgical safety checklist completed prior to induction  PQRS# 430 - Adult PONV Prevention: 4558F - Pt received => 2 anti-emetic agents (different classes) preop & intraop  ASA# 8 - Peds PONV Prevention: NA - Not pediatric patient, not GA or 2 or more risk factors NOT present  PQRS# 424 - Amy-op Temp Management: 4559F - At least one body temp DOCUMENTED => 35.5C or 95.9F within required timeframe  PQRS# 426 - PACU Transfer Protocol: - Transfer of care checklist used  ASA# 14 - Acute Post-op Pain: ASA14B - Patient did NOT experience pain >= 7 out of 10

## 2021-06-16 ENCOUNTER — RECORDS - HEALTHEAST (OUTPATIENT)
Dept: FAMILY MEDICINE | Facility: CLINIC | Age: 56
End: 2021-06-16

## 2021-06-16 PROBLEM — I10 HTN (HYPERTENSION): Status: ACTIVE | Noted: 2019-11-11

## 2021-06-16 PROBLEM — N40.0 BENIGN PROSTATIC HYPERPLASIA: Status: ACTIVE | Noted: 2019-11-11

## 2021-06-16 PROBLEM — G62.9 PERIPHERAL NEUROPATHY: Status: ACTIVE | Noted: 2018-12-20

## 2021-06-16 NOTE — TELEPHONE ENCOUNTER
Medication Request  Medication name: ASA 81 mg   Requested Pharmacy: [facility]  Reason for request: Refill  When did you use medication last?:  unknown  Patient offered appointment:  N/A - electronic request  Okay to leave a detailed message: no

## 2021-06-16 NOTE — PROGRESS NOTES
Subjective:   Pb comes in with a tingling sensation in both lower extremities.  He has had this over the last 3 or 4 months.  It really has not progressed since he first started noticing this.  He denies injuries to his back or legs.  He denies back pain.  He has no weakness in his legs.  He has no numbness.  He is diabetic.  Arms are not involved at all.  He has had a history of restless leg syndrome in the past and he states that this tingling sensation does not feel like that at all.  The tingling is fairly constant.  It is from his hips all the way to his feet.  He denies edema in his feet.      Objective:  Musculoskeletal: The low back has no palpable tenderness.  Sacroiliac areas are nontender.  Straight leg raising today is negative.  The hips, knees and ankles all had normal range of motion.  No crepitus noted in the knees.  No edema noted in the legs.  Pulses are strong in the lower extremities.  Neurologic: Motor and sensory exams today appear normal.  Strength in the legs is normal.  Patient could do single leg knee bending without any concerns.  Gait was stable.  Sensory exam totally normal in the feet.  Deep tendon reflexes symmetric in knee and ankle areas.      Assessment:  1.  Tingling in both legs consistent with neuritis.  Rule out B12 deficiency.  Rule out thyroid conditions.      Plan:  B12 and thyroid to be checked today.  If these are normal the patient may benefit from EMGs of the lower extremities.  If that is normal I would recommend neurological evaluation and consultation.  A trial of gabapentin may be beneficial if all lab work is normal.

## 2021-06-17 NOTE — TELEPHONE ENCOUNTER
RN cannot approve Refill Request    RN can NOT refill this medication PCP messaged that patient is overdue for Labs and Office Visit. Last office visit: 11/11/2019 Cortes Astudillo MD Last Physical: Visit date not found Last MTM visit: Visit date not found Last visit same specialty: 11/11/2019 Cortes Astudillo MD.  Next visit within 3 mo: Visit date not found  Next physical within 3 mo: Visit date not found      Lauren Irene, [facility] Triage/Med Refill 5/4/2021    Requested Prescriptions   Pending Prescriptions Disp Refills     glimepiride (AMARYL) 4 MG tablet [Pharmacy Med Name: GLIMEPIRIDE 4MG TABLETS] 90 tablet 1     Sig: TAKE 1 TABLET(4 MG) BY MOUTH DAILY BEFORE BREAKFAST       Oral Hypoglycemics Refill Protocol Failed - 5/4/2021  9:03 AM        Failed - Visit with PCP or prescribing provider visit in last 6 months       Last office visit with prescriber/PCP: Visit date not found OR same dept: Visit date not found OR same specialty: 11/11/2019 Cortes Astudillo MD Last physical: Visit date not found Last MTM visit: Visit date not found         Next appt within 3 mo: Visit date not found  Next physical within 3 mo: Visit date not found  Prescriber OR PCP: Cortes Astudillo MD  Last diagnosis associated with med order: 1. Type 2 diabetes mellitus without complication, without long-term current use of insulin (H)  - glimepiride (AMARYL) 4 MG tablet [Pharmacy Med Name: GLIMEPIRIDE 4MG TABLETS]; TAKE 1 TABLET(4 MG) BY MOUTH DAILY BEFORE BREAKFAST  Dispense: 90 tablet; Refill: 1     If protocol passes may refill for 12 months if within 3 months of last provider visit (or a total of 15 months).           Failed - A1C in last 6 months     Hemoglobin A1c_EXT   Date Value Ref Range Status   06/30/2020 7.1 (!) 4.0 - 6.0 % Final               Failed - Microalbumin in last year      Microalbumin, Random Urine   Date Value Ref Range Status   12/20/2018 1.72 0.00 - 1.99 mg/dL Final                  Failed - Blood  pressure in last year     BP Readings from Last 1 Encounters:   11/11/19 110/64             Failed - Serum creatinine in last year     Creatinine   Date Value Ref Range Status   11/11/2019 1.21 0.70 - 1.30 mg/dL Final

## 2021-06-17 NOTE — TELEPHONE ENCOUNTER
Spoke with patient informing him that his RX is ready. Helped patient schedule lab and will be coming in 5/6/21 at 720am.

## 2021-06-20 ENCOUNTER — HEALTH MAINTENANCE LETTER (OUTPATIENT)
Age: 56
End: 2021-06-20

## 2021-06-20 NOTE — LETTER
Letter by Cortes Astudillo MD at      Author: Cortes Astudillo MD Service: -- Author Type: --    Filed:  Encounter Date: 7/1/2020 Status: (Other)         Pb AURE Ivis  [address]  [place] [address]      July 1, 2020      Dear Pb,    As a valued [facility] patient, your healthcare needs are our priority.  Your health care team has determined that you are due for an appointment regarding your lab and virtual visit with Dr. Astudillo.    To help prevent delays in your care, please call the [facility] at [phone].    We look forward to partnering with you to achieve optimal health and wellbeing.    Sincerely,  Your care team at [facility] and [facility]

## 2021-06-20 NOTE — PROGRESS NOTES
Subjective:   Pb comes in today for check of his diabetes.  He states his sugars have been as high as 300 fasting at home.  He has had a little bit of polyuria and polydipsia.  He is getting diarrhea to his metformin and he would like to change that medication.  He also is here because of some neuropathy symptoms in his feet and legs.  He feels like the tingling in his feet extend up to his knees and sometimes to his thighs.  This is bilateral.  He has had thyroid and B12 checked in the past which was normal.  His problem seems to be worsening.  He also will get sharp tingling pains occasionally in his hands which come and go within seconds.  Nothing in general brings on those symptoms.        Objective:  HEENT:Fundi appear benign.  Conjunctiva clear.  Oral mucosa moist.  Neck: No obvious increased JVD noted.  No lymphadenopathy noted.  Lungs: Lungs are clear.  Patient is in no respiratory distress.  Cardiac: There is a regular rhythm present.  No ectopy or murmur heard.  Neurologic: The feet had decreased sensation to touch.  Palpation made tingling in his feet worse.  No edema noted in his feet or legs.  Motor strength appeared normal.  gait was stable.  Patient alert and oriented.    Assessment:  1.  Diabetes mellitus in poor control on metformin  2.  Diarrhea secondary to metformin side effect  3.  Peripheral neuropathy most likely secondary to diabetes.  Rule out other causes.      Plan:  Patient will be referred to neurology for evaluation of his neuropathy.  He may benefit from further workup.  We will change to Jardiance 10 mg daily from his metformin.  This appears to be on his formulary.  He will monitor for side effects.  Follow-up here within 1 month to recheck sugars.  To new monitoring sugars at home.

## 2021-06-23 NOTE — TELEPHONE ENCOUNTER
Refill Approved    Rx renewed per Medication Renewal Policy. Medication was last renewed on 12/21/18.    Erma Panda, [facility] Triage/Med Refill 1/16/2019     Requested Prescriptions   Pending Prescriptions Disp Refills     glimepiride (AMARYL) 2 MG tablet [Pharmacy Med Name: GLIMEPIRIDE 2MG TABLETS] 30 tablet 0     Sig: TAKE 1 TABLET(2 MG) BY MOUTH EVERY MORNING    Oral Hypoglycemics Refill Protocol Passed - 1/15/2019  5:23 PM       Passed - Visit with PCP or prescribing provider visit in last 6 months      Last office visit with prescriber/PCP: 12/20/2018 OR same dept: 12/20/2018 Cortes Astudillo MD OR same specialty: 12/20/2018 Cortes Astudillo MD Last physical: Visit date not found Last MTM visit: Visit date not found         Next appt within 3 mo: Visit date not found  Next physical within 3 mo: Visit date not found  Prescriber OR PCP: Cortes Astudillo MD  Last diagnosis associated with med order: There are no diagnoses linked to this encounter.   If protocol passes may refill for 12 months if within 3 months of last provider visit (or a total of 15 months).          Passed - A1C in last 6 months    Hemoglobin A1c   Date Value Ref Range Status   12/20/2018 9.3 (H) 3.5 - 6.0 % Final              Passed - Microalbumin in last year     Microalbumin, Random Urine   Date Value Ref Range Status   12/20/2018 1.72 0.00 - 1.99 mg/dL Final                 Passed - Blood pressure in last year    BP Readings from Last 1 Encounters:   12/20/18 110/60            Passed - Serum creatinine in last year    Creatinine   Date Value Ref Range Status   10/01/2018 1.18 0.70 - 1.30 mg/dL Final

## 2021-07-03 NOTE — ADDENDUM NOTE
Addendum Note by Dariel Kendall MD at 2/9/2018  4:44 PM     Author: Dariel Kendall MD Service: -- Author Type: Physician    Filed: 2/9/2018  4:44 PM Encounter Date: 2/9/2018 Status: Signed    : Dariel Kendall MD (Physician)    Addended by: DARIEL KENDALL on: 2/9/2018 04:44 PM        Modules accepted: Orders

## 2021-07-22 ENCOUNTER — OFFICE VISIT (OUTPATIENT)
Dept: FAMILY MEDICINE | Facility: CLINIC | Age: 56
End: 2021-07-22
Payer: COMMERCIAL

## 2021-07-22 VITALS
SYSTOLIC BLOOD PRESSURE: 120 MMHG | HEART RATE: 72 BPM | DIASTOLIC BLOOD PRESSURE: 84 MMHG | BODY MASS INDEX: 26.22 KG/M2 | RESPIRATION RATE: 22 BRPM | WEIGHT: 177 LBS | TEMPERATURE: 97.8 F | HEIGHT: 69 IN

## 2021-07-22 DIAGNOSIS — M79.662 PAIN OF LEFT LOWER LEG: Primary | ICD-10-CM

## 2021-07-22 PROCEDURE — 99213 OFFICE O/P EST LOW 20 MIN: CPT | Performed by: FAMILY MEDICINE

## 2021-07-22 RX ORDER — BLOOD SUGAR DIAGNOSTIC
STRIP MISCELLANEOUS
COMMUNITY
Start: 2021-05-29 | End: 2024-08-21

## 2021-07-22 RX ORDER — BLOOD SUGAR DIAGNOSTIC
1 STRIP MISCELLANEOUS
COMMUNITY
Start: 2021-05-26

## 2021-07-22 RX ORDER — BENZALKONIUM CHLORIDE 1.3 MG/ML
CLOTH TOPICAL
COMMUNITY
Start: 2019-10-18

## 2021-07-22 RX ORDER — LISINOPRIL 10 MG/1
TABLET ORAL
COMMUNITY
Start: 2020-10-05 | End: 2022-11-20

## 2021-07-22 RX ORDER — LANCETS 33 GAUGE
1 EACH MISCELLANEOUS
COMMUNITY
Start: 2019-11-11

## 2021-07-22 RX ORDER — MULTIVITAMIN,THERAPEUTIC
1 TABLET ORAL
COMMUNITY

## 2021-07-22 RX ORDER — ATORVASTATIN CALCIUM 10 MG/1
TABLET, FILM COATED ORAL
COMMUNITY
Start: 2021-06-25 | End: 2023-03-04 | Stop reason: ALTCHOICE

## 2021-07-22 RX ORDER — INSULIN PUMP SYRINGE, 3 ML
EACH MISCELLANEOUS
COMMUNITY
Start: 2019-11-11

## 2021-07-22 ASSESSMENT — MIFFLIN-ST. JEOR: SCORE: 1617.86

## 2021-07-22 NOTE — PROGRESS NOTES
Subjective:  56 year old male with concerns of a strange pain in left medial lower leg and ankle.  This happened about a month ago when his dog jumped on him and he had a unexpectedly significant amount of pain from a seemingly light contact.  Since then is beginning to get a little bit better but seems to be having a very protracted recovery.  Never had any ecchymosis or edema.  Thought the area might of felt a bit soft compared to the contralateral.  Has been able to walk without difficulty.    Outpatient Medications Prior to Visit   Medication Sig Dispense Refill     AgaMatrix Ultra-Thin Lancets MISC 1 each       blood glucose (PRECISION QID TEST) test strip 1 each       Blood Glucose Monitoring Suppl (FIFTY50 GLUCOSE METER 2.0) w/Device KIT Use the Verio IQ meter as directed to check blood sugar       Respiratory Therapy Supplies (CARETOUCH 2 CPAP HOSE ) MISC CPAP machine for home use at pressure: 5-20 cm, full face mask x1/3month with a full face cushion x1/mo       atorvastatin (LIPITOR) 10 MG tablet        darunavir-cobicistat (PREZCOBIX) 800-150 MG per tablet Take 800 mg by mouth       DESCOVY 200-25 MG per tablet TK 1 T PO D  1     dolutegravir (TIVICAY) 50 MG tablet Take 50 mg by mouth       fenofibrate (TRICOR) 145 MG tablet Take 145 mg by mouth       glimepiride (AMARYL) 2 MG tablet   10     JARDIANCE 10 MG TABS tablet   11     lisinopril (PRINIVIL/ZESTRIL) 5 MG tablet   1     lisinopril (ZESTRIL) 10 MG tablet TK SS T PO D       Multiple Vitamins-Minerals (ONCOVITE) TABS Take 1 tablet by mouth       omeprazole (PRILOSEC) 20 MG DR capsule   0     ONETOUCH VERIO IQ test strip TEST AS DIRECTED UP TO THREE TIMES DAILY       terazosin (HYTRIN) 2 MG capsule   3     valACYclovir (VALTREX) 500 MG tablet   0     No facility-administered medications prior to visit.      History   Smoking Status     Never Smoker   Smokeless Tobacco     Never Used      Objective:  /84 (BP Location: Left arm, Patient  "Position: Sitting, Cuff Size: Adult Regular)   Pulse 72   Temp 97.8  F (36.6  C) (Temporal)   Resp 22   Ht 1.76 m (5' 9.29\")   Wt 80.3 kg (177 lb)   BMI 25.92 kg/m    GENERAL: alert, not distressed  MS: Bilateral lower extremities without edema.  Mild varicosity.  Mild tenderness medial tibia just above medial malleolus.  Normal ankle drawer.  No significant pain with inversion or eversion stresses.  SKIN: No ecchymosis or edema noted in the area of concern.      Tibia and fibula x-ray:  Personally reviewed today.  No fracture or bony abnormality appreciated.    Assessment and Plan:   X-ray appears normal.  Fairly interesting that we cannot find anything either with imaging or on examination,  but that it still feels like something significant happened.  Encouraged by the fact that it is getting better.  I would continue to track that improvement and let us know if it stalls or does not progress to eventual resolution.  I think that a deeper bone bruise is a possible explanation.  "

## 2021-07-29 ENCOUNTER — TRANSFERRED RECORDS (OUTPATIENT)
Dept: HEALTH INFORMATION MANAGEMENT | Facility: CLINIC | Age: 56
End: 2021-07-29

## 2021-10-10 ENCOUNTER — HEALTH MAINTENANCE LETTER (OUTPATIENT)
Age: 56
End: 2021-10-10

## 2021-10-30 DIAGNOSIS — E11.9 TYPE 2 DIABETES MELLITUS WITHOUT COMPLICATION, WITHOUT LONG-TERM CURRENT USE OF INSULIN (H): Primary | ICD-10-CM

## 2021-10-31 RX ORDER — GLIMEPIRIDE 4 MG/1
4 TABLET ORAL
Qty: 90 TABLET | Refills: 1 | Status: SHIPPED | OUTPATIENT
Start: 2021-10-31 | End: 2022-04-27

## 2021-11-04 ENCOUNTER — TRANSFERRED RECORDS (OUTPATIENT)
Dept: HEALTH INFORMATION MANAGEMENT | Facility: CLINIC | Age: 56
End: 2021-11-04
Payer: COMMERCIAL

## 2021-12-03 ENCOUNTER — MYC MEDICAL ADVICE (OUTPATIENT)
Dept: FAMILY MEDICINE | Facility: CLINIC | Age: 56
End: 2021-12-03
Payer: COMMERCIAL

## 2021-12-03 DIAGNOSIS — E11.9 TYPE 2 DIABETES MELLITUS WITHOUT COMPLICATION, WITHOUT LONG-TERM CURRENT USE OF INSULIN (H): Primary | ICD-10-CM

## 2021-12-03 RX ORDER — EMPAGLIFLOZIN 10 MG/1
TABLET, FILM COATED ORAL
Refills: 11 | Status: CANCELLED | OUTPATIENT
Start: 2021-12-03

## 2021-12-04 ENCOUNTER — HEALTH MAINTENANCE LETTER (OUTPATIENT)
Age: 56
End: 2021-12-04

## 2022-01-27 ENCOUNTER — TRANSFERRED RECORDS (OUTPATIENT)
Dept: HEALTH INFORMATION MANAGEMENT | Facility: CLINIC | Age: 57
End: 2022-01-27
Payer: COMMERCIAL

## 2022-04-25 DIAGNOSIS — E11.9 TYPE 2 DIABETES MELLITUS WITHOUT COMPLICATION, WITHOUT LONG-TERM CURRENT USE OF INSULIN (H): ICD-10-CM

## 2022-04-27 NOTE — TELEPHONE ENCOUNTER
Routing refill request to provider for review/approval because:  --Labs out of range:  A1c, Scr.  --Labs not current:  A1c, Scr.  --Due for diabetes visit.          --Last visit:  7/22/2021 [facility] for acute care.    --Future Visit: none.    Creatinine   Date Value Ref Range Status   05/07/2021 1.43 (H) 0.70 - 1.30 mg/dL Final   11/11/2019 1.21 0.70 - 1.30 mg/dL Final   04/15/2019 1.11 0.70 - 1.30 mg/dL Final   01/30/2019 0.98 0.66 - 1.25 mg/dL Final   10/01/2018 1.18 0.70 - 1.30 mg/dL Final   01/10/2018 0.98 0.70 - 1.30 mg/dL Final       Hemoglobin A1C   Date Value Ref Range Status   05/07/2021 7.9 (H) <=5.6 % Final   11/11/2019 9.0 (H) 3.5 - 6.0 % Final   04/15/2019 8.2 (H) 3.5 - 6.0 % Final

## 2022-04-28 RX ORDER — GLIMEPIRIDE 4 MG/1
4 TABLET ORAL
Qty: 90 TABLET | Refills: 0 | Status: SHIPPED | OUTPATIENT
Start: 2022-04-28 | End: 2022-05-30

## 2022-05-13 NOTE — TELEPHONE ENCOUNTER
Spoke with pt and he stated he is fairly busy the next few weeks so he will call us back to schedule a visit.

## 2022-05-21 ENCOUNTER — HEALTH MAINTENANCE LETTER (OUTPATIENT)
Age: 57
End: 2022-05-21

## 2022-05-22 ENCOUNTER — MYC MEDICAL ADVICE (OUTPATIENT)
Dept: FAMILY MEDICINE | Facility: CLINIC | Age: 57
End: 2022-05-22
Payer: COMMERCIAL

## 2022-05-22 DIAGNOSIS — E11.9 TYPE 2 DIABETES MELLITUS WITHOUT COMPLICATION, WITHOUT LONG-TERM CURRENT USE OF INSULIN (H): ICD-10-CM

## 2022-05-24 RX ORDER — BICTEGRAVIR SODIUM, EMTRICITABINE, AND TENOFOVIR ALAFENAMIDE FUMARATE 50; 200; 25 MG/1; MG/1; MG/1
1 TABLET ORAL DAILY
COMMUNITY
Start: 2022-04-08 | End: 2024-08-13

## 2022-05-25 ENCOUNTER — DOCUMENTATION ONLY (OUTPATIENT)
Dept: FAMILY MEDICINE | Facility: CLINIC | Age: 57
End: 2022-05-25
Payer: COMMERCIAL

## 2022-05-30 RX ORDER — GLIMEPIRIDE 4 MG/1
8 TABLET ORAL
Qty: 90 TABLET | Refills: 0 | Status: SHIPPED | OUTPATIENT
Start: 2022-05-30 | End: 2022-08-10

## 2022-06-01 DIAGNOSIS — E11.9 TYPE 2 DIABETES MELLITUS WITHOUT COMPLICATION, WITHOUT LONG-TERM CURRENT USE OF INSULIN (H): ICD-10-CM

## 2022-06-02 NOTE — TELEPHONE ENCOUNTER
"Routing refill request to provider for review/approval because:  Labs not current:  Multiple  Patient needs to be seen because it has been more than 6 months since last office visit.    Last Written Prescription Date:  12/4/21  Last Fill Quantity: 90,  # refills: 1   Last office visit provider:  7/22/21     Requested Prescriptions   Pending Prescriptions Disp Refills     empagliflozin (JARDIANCE) 25 MG TABS tablet 90 tablet 1     Sig: Take 1 tablet (25 mg) by mouth daily       Sodium Glucose Co-Transport Inhibitor Agents Failed - 6/2/2022  2:50 PM        Failed - Patient has documented A1c within the specified period of time.     If HgbA1C is 8 or greater, it needs to be on file within the past 3 months.  If less than 8, must be on file within the past 6 months.     Recent Labs   Lab Test 05/07/21  0729   A1C 7.9*             Failed - No creatinine >1.4 or GFR <45 within the past 12 mos     Recent Labs   Lab Test 05/07/21  0729   GFRESTIMATED 51*   GFRESTBLACK >60       Recent Labs   Lab Test 05/07/21  0729   CR 1.43*             Failed - Patient has documented normal Potassium within the last 12 mos.     Recent Labs   Lab Test 05/07/21  0729   POTASSIUM 4.3             Failed - Recent (6 mo) or future (30 days) visit within the authorizing provider's specialty     Patient had office visit in the last 6 months or has a visit in the next 30 days with authorizing provider or within the authorizing provider's specialty.  See \"Patient Info\" tab in inbasket, or \"Choose Columns\" in Meds & Orders section of the refill encounter.            Passed - Medication is active on med list        Passed - Patient is age 18 or older             Delbert Orozco RN 06/02/22 2:50 PM  "

## 2022-07-16 ENCOUNTER — HEALTH MAINTENANCE LETTER (OUTPATIENT)
Age: 57
End: 2022-07-16

## 2022-08-10 ENCOUNTER — MYC REFILL (OUTPATIENT)
Dept: FAMILY MEDICINE | Facility: CLINIC | Age: 57
End: 2022-08-10

## 2022-08-10 DIAGNOSIS — E11.9 TYPE 2 DIABETES MELLITUS WITHOUT COMPLICATION, WITHOUT LONG-TERM CURRENT USE OF INSULIN (H): ICD-10-CM

## 2022-08-11 RX ORDER — GLIMEPIRIDE 4 MG/1
8 TABLET ORAL
Qty: 90 TABLET | Refills: 0 | Status: SHIPPED | OUTPATIENT
Start: 2022-08-11 | End: 2022-09-13

## 2022-08-11 NOTE — TELEPHONE ENCOUNTER
"Routing refill request to provider for review/approval because:  Labs not current:  A1c, creatinine  Patient needs to be seen    Last Written Prescription Date:  5/30/2022  Last Fill Quantity: 90,  # refills: 0   Last office visit provider:  7/22/2021     Requested Prescriptions   Pending Prescriptions Disp Refills     glimepiride (AMARYL) 4 MG tablet 90 tablet 0     Sig: Take 2 tablets (8 mg) by mouth every morning (before breakfast)       Sulfonylurea Agents Failed - 8/10/2022  9:09 AM        Failed - Patient has documented A1c within the specified period of time.     If HgbA1C is 8 or greater, it needs to be on file within the past 3 months.  If less than 8, must be on file within the past 6 months.     Recent Labs   Lab Test 05/07/21  0729   A1C 7.9*             Failed - Patient has a recent creatinine (normal) within the past 12 mos.     Recent Labs   Lab Test 05/07/21  0729   CR 1.43*       Ok to refill medication if creatinine is low          Failed - Recent (6 mo) or future (30 days) visit within the authorizing provider's specialty     Patient had office visit in the last 6 months or has a visit in the next 30 days with authorizing provider or within the authorizing provider's specialty.  See \"Patient Info\" tab in inbasket, or \"Choose Columns\" in Meds & Orders section of the refill encounter.            Passed - Medication is active on med list        Passed - Patient is age 18 or older             Lara Biggs RN 08/10/22 10:56 PM  "

## 2022-09-12 DIAGNOSIS — E11.9 TYPE 2 DIABETES MELLITUS WITHOUT COMPLICATION, UNSPECIFIED WHETHER LONG TERM INSULIN USE (H): Primary | ICD-10-CM

## 2022-09-13 ENCOUNTER — MYC MEDICAL ADVICE (OUTPATIENT)
Dept: FAMILY MEDICINE | Facility: CLINIC | Age: 57
End: 2022-09-13

## 2022-09-13 DIAGNOSIS — E11.9 TYPE 2 DIABETES MELLITUS WITHOUT COMPLICATION, WITHOUT LONG-TERM CURRENT USE OF INSULIN (H): ICD-10-CM

## 2022-09-13 RX ORDER — GLIMEPIRIDE 4 MG/1
8 TABLET ORAL
Qty: 90 TABLET | Refills: 0 | Status: SHIPPED | OUTPATIENT
Start: 2022-09-13 | End: 2022-10-13

## 2022-09-14 ENCOUNTER — LAB (OUTPATIENT)
Dept: LAB | Facility: CLINIC | Age: 57
End: 2022-09-14
Payer: COMMERCIAL

## 2022-09-14 DIAGNOSIS — E11.9 TYPE 2 DIABETES MELLITUS WITHOUT COMPLICATION, UNSPECIFIED WHETHER LONG TERM INSULIN USE (H): ICD-10-CM

## 2022-09-14 LAB
ANION GAP SERPL CALCULATED.3IONS-SCNC: 15 MMOL/L (ref 7–15)
BUN SERPL-MCNC: 30.7 MG/DL (ref 8–23)
CALCIUM SERPL-MCNC: 9.7 MG/DL (ref 8.6–10)
CHLORIDE SERPL-SCNC: 99 MMOL/L (ref 98–107)
CHOLEST SERPL-MCNC: 252 MG/DL
CREAT SERPL-MCNC: 0.94 MG/DL (ref 0.67–1.17)
CREAT UR-MCNC: 50.9 MG/DL
DEPRECATED HCO3 PLAS-SCNC: 22 MMOL/L (ref 22–29)
GFR SERPL CREATININE-BSD FRML MDRD: >90 ML/MIN/1.73M2
GLUCOSE SERPL-MCNC: 270 MG/DL (ref 70–99)
HBA1C MFR BLD: 9.4 % (ref 0–5.6)
HDLC SERPL-MCNC: 39 MG/DL
LDLC SERPL CALC-MCNC: ABNORMAL MG/DL
LDLC SERPL DIRECT ASSAY-MCNC: 94 MG/DL
MICROALBUMIN UR-MCNC: <12 MG/L
MICROALBUMIN/CREAT UR: NORMAL MG/G{CREAT}
NONHDLC SERPL-MCNC: 213 MG/DL
POTASSIUM SERPL-SCNC: 4.2 MMOL/L (ref 3.4–5.3)
SODIUM SERPL-SCNC: 136 MMOL/L (ref 136–145)
TRIGL SERPL-MCNC: 823 MG/DL

## 2022-09-14 PROCEDURE — 82043 UR ALBUMIN QUANTITATIVE: CPT | Performed by: FAMILY MEDICINE

## 2022-09-14 PROCEDURE — 36415 COLL VENOUS BLD VENIPUNCTURE: CPT | Performed by: FAMILY MEDICINE

## 2022-09-14 PROCEDURE — 80061 LIPID PANEL: CPT | Performed by: FAMILY MEDICINE

## 2022-09-14 PROCEDURE — 83721 ASSAY OF BLOOD LIPOPROTEIN: CPT | Mod: 59 | Performed by: FAMILY MEDICINE

## 2022-09-14 PROCEDURE — 83036 HEMOGLOBIN GLYCOSYLATED A1C: CPT | Performed by: FAMILY MEDICINE

## 2022-09-14 PROCEDURE — 80048 BASIC METABOLIC PNL TOTAL CA: CPT | Performed by: FAMILY MEDICINE

## 2022-09-15 ENCOUNTER — MYC MEDICAL ADVICE (OUTPATIENT)
Dept: FAMILY MEDICINE | Facility: CLINIC | Age: 57
End: 2022-09-15

## 2022-09-15 DIAGNOSIS — E11.9 TYPE 2 DIABETES MELLITUS WITHOUT COMPLICATION, WITHOUT LONG-TERM CURRENT USE OF INSULIN (H): Primary | ICD-10-CM

## 2022-09-15 DIAGNOSIS — E11.9 TYPE 2 DIABETES MELLITUS WITHOUT COMPLICATION, UNSPECIFIED WHETHER LONG TERM INSULIN USE (H): ICD-10-CM

## 2022-09-18 ENCOUNTER — HEALTH MAINTENANCE LETTER (OUTPATIENT)
Age: 57
End: 2022-09-18

## 2022-09-29 RX ORDER — SEMAGLUTIDE 1.34 MG/ML
0.25 INJECTION, SOLUTION SUBCUTANEOUS
Qty: 0.75 ML | Refills: 0 | Status: SHIPPED | OUTPATIENT
Start: 2022-09-29 | End: 2022-10-29

## 2022-10-04 RX ORDER — DULAGLUTIDE 0.75 MG/.5ML
0.75 INJECTION, SOLUTION SUBCUTANEOUS
Qty: 0.5 ML | Refills: 1 | Status: SHIPPED | OUTPATIENT
Start: 2022-10-04 | End: 2022-10-31

## 2022-10-07 ENCOUNTER — TELEPHONE (OUTPATIENT)
Dept: FAMILY MEDICINE | Facility: CLINIC | Age: 57
End: 2022-10-07

## 2022-10-07 NOTE — TELEPHONE ENCOUNTER
Central Prior Authorization Team - Phone: [phone]     PA Initiation    Medication: dulaglutide (TRULICITY) 0.75 MG/0.5ML pen- PA INITIATED  Insurance Company:    Pharmacy Filling the Rx: [id number] - [place], [place] - [address] AT [facility] [address]  Filling Pharmacy Phone: [phone]  Filling Pharmacy Fax:    Start Date: 10/7/2022

## 2022-10-07 NOTE — TELEPHONE ENCOUNTER
Central Prior Authorization Team - Phone: [phone]     Prior Authorization Approval    Authorization Effective Date: 10/7/2022  Authorization Expiration Date: 10/7/2023  Medication: dulaglutide (TRULICITY) 0.75 MG/0.5ML pen- PA APPROVED  Approved Dose/Quantity: 2  Reference #:     Insurance Company:    Expected CoPay:       CoPay Card Available:      Foundation Assistance Needed:    Which Pharmacy is filling the prescription (Not needed for infusion/clinic administered): [id number] - [place], [place] - [address]  AT [facility] [address]  Pharmacy Notified: Yes  Patient Notified: YesComment:  pharmacy will notify when ready

## 2022-10-31 ENCOUNTER — MYC MEDICAL ADVICE (OUTPATIENT)
Dept: FAMILY MEDICINE | Facility: CLINIC | Age: 57
End: 2022-10-31

## 2022-10-31 DIAGNOSIS — E11.9 TYPE 2 DIABETES MELLITUS WITHOUT COMPLICATION, WITHOUT LONG-TERM CURRENT USE OF INSULIN (H): ICD-10-CM

## 2022-10-31 RX ORDER — DULAGLUTIDE 0.75 MG/.5ML
0.75 INJECTION, SOLUTION SUBCUTANEOUS
Qty: 0.5 ML | Refills: 1 | Status: SHIPPED | OUTPATIENT
Start: 2022-10-31 | End: 2022-11-25

## 2022-10-31 NOTE — TELEPHONE ENCOUNTER
Message routed to PCP, Dr Astudillo.    Farida Parrish RN  [facility]      To: [facility] FAMILY MEDICINE/OB SUPPORT POOL      From: Pb Langston      Created: 10/31/2022 9:10 AM        Dr. Astudillo,     I just finished my first month on Trulicity and thing are going well.  I haven t noticed any side effects and my counts are dropping.  I still have a ways to go but on the right track.  Trying watch the diet as well, even though I eat pretty well there is always room for improvement.     The pharmacy has called in a refill request to get my next month s supply.  I am leaving Saturday for a two week vacation so need this refilled before then.     Thanks     Pb

## 2022-11-09 DIAGNOSIS — E11.9 TYPE 2 DIABETES MELLITUS WITHOUT COMPLICATION, WITHOUT LONG-TERM CURRENT USE OF INSULIN (H): ICD-10-CM

## 2022-11-11 NOTE — TELEPHONE ENCOUNTER
"Routing refill request to provider for review/approval because:  Patient needs to be seen because it has been more than 1 year since last office visit.    Last Written Prescription Date:  10/31/22  Last Fill Quantity: 0.5 ml,  # refills: 1   Last office visit provider:  7/22/21     Requested Prescriptions   Pending Prescriptions Disp Refills     dulaglutide (TRULICITY) 0.75 MG/0.5ML pen 0.5 mL 1     Sig: Inject 0.75 mg Subcutaneous every 7 days       GLP-1 Agonists Protocol Failed - 11/11/2022  9:42 AM        Failed - Recent (6 mo) or future (30 days) visit within the authorizing provider's specialty     Patient had office visit in the last 6 months or has a visit in the next 30 days with authorizing provider.  See \"Patient Info\" tab in inbasket, or \"Choose Columns\" in Meds & Orders section of the refill encounter.            Passed - HgbA1C in past 3 or 6 months     If HgbA1C is 8 or greater, it needs to be on file within the past 3 months.  If less than 8, must be on file within the past 6 months.     Recent Labs   Lab Test 09/14/22  0734   A1C 9.4*             Passed - Medication is active on med list        Passed - Patient is age 18 or older        Passed - Normal serum creatinine on file in past 12 months     Recent Labs   Lab Test 09/14/22  0734   CR 0.94       Ok to refill medication if creatinine is low               Delbert Orozco RN 11/11/22 9:43 AM  "

## 2022-11-13 RX ORDER — DULAGLUTIDE 0.75 MG/.5ML
0.75 INJECTION, SOLUTION SUBCUTANEOUS
Qty: 6 ML | Refills: 0 | OUTPATIENT
Start: 2022-11-13

## 2022-11-20 ENCOUNTER — OFFICE VISIT (OUTPATIENT)
Dept: FAMILY MEDICINE | Facility: CLINIC | Age: 57
End: 2022-11-20
Payer: COMMERCIAL

## 2022-11-20 ENCOUNTER — NURSE TRIAGE (OUTPATIENT)
Dept: NURSING | Facility: CLINIC | Age: 57
End: 2022-11-20

## 2022-11-20 VITALS
DIASTOLIC BLOOD PRESSURE: 63 MMHG | HEART RATE: 72 BPM | TEMPERATURE: 97.9 F | WEIGHT: 165.6 LBS | OXYGEN SATURATION: 97 % | BODY MASS INDEX: 24.25 KG/M2 | SYSTOLIC BLOOD PRESSURE: 116 MMHG

## 2022-11-20 DIAGNOSIS — R19.7 DIARRHEA OF PRESUMED INFECTIOUS ORIGIN: Primary | ICD-10-CM

## 2022-11-20 PROBLEM — K42.9 RECURRENT UMBILICAL HERNIA: Status: ACTIVE | Noted: 2022-10-06

## 2022-11-20 PROBLEM — K80.20 CHOLELITHIASIS: Status: ACTIVE | Noted: 2022-10-06

## 2022-11-20 PROBLEM — D12.3 BENIGN NEOPLASM OF TRANSVERSE COLON: Status: ACTIVE | Noted: 2018-04-26

## 2022-11-20 PROBLEM — E78.5 HYPERLIPIDEMIA: Status: ACTIVE | Noted: 2022-10-06

## 2022-11-20 PROBLEM — K57.30 DIVERTICULAR DISEASE OF LARGE INTESTINE: Status: ACTIVE | Noted: 2018-04-26

## 2022-11-20 LAB
ANION GAP SERPL CALCULATED.3IONS-SCNC: 13 MMOL/L (ref 7–15)
BASOPHILS # BLD AUTO: 0 10E3/UL (ref 0–0.2)
BASOPHILS NFR BLD AUTO: 1 %
BUN SERPL-MCNC: 21 MG/DL (ref 8–23)
CALCIUM SERPL-MCNC: 9.2 MG/DL (ref 8.6–10)
CHLORIDE SERPL-SCNC: 102 MMOL/L (ref 98–107)
CREAT SERPL-MCNC: 1.02 MG/DL (ref 0.67–1.17)
DEPRECATED HCO3 PLAS-SCNC: 23 MMOL/L (ref 22–29)
EOSINOPHIL # BLD AUTO: 0.2 10E3/UL (ref 0–0.7)
EOSINOPHIL NFR BLD AUTO: 2 %
ERYTHROCYTE [DISTWIDTH] IN BLOOD BY AUTOMATED COUNT: 13.1 % (ref 10–15)
GFR SERPL CREATININE-BSD FRML MDRD: 85 ML/MIN/1.73M2
GLUCOSE SERPL-MCNC: 162 MG/DL (ref 70–99)
HCT VFR BLD AUTO: 43.6 % (ref 40–53)
HGB BLD-MCNC: 15.1 G/DL (ref 13.3–17.7)
IMM GRANULOCYTES # BLD: 0.1 10E3/UL
IMM GRANULOCYTES NFR BLD: 1 %
LYMPHOCYTES # BLD AUTO: 2.1 10E3/UL (ref 0.8–5.3)
LYMPHOCYTES NFR BLD AUTO: 27 %
MCH RBC QN AUTO: 30.9 PG (ref 26.5–33)
MCHC RBC AUTO-ENTMCNC: 34.6 G/DL (ref 31.5–36.5)
MCV RBC AUTO: 89 FL (ref 78–100)
MONOCYTES # BLD AUTO: 1 10E3/UL (ref 0–1.3)
MONOCYTES NFR BLD AUTO: 13 %
NEUTROPHILS # BLD AUTO: 4.5 10E3/UL (ref 1.6–8.3)
NEUTROPHILS NFR BLD AUTO: 57 %
PLATELET # BLD AUTO: 313 10E3/UL (ref 150–450)
POTASSIUM SERPL-SCNC: 4 MMOL/L (ref 3.4–5.3)
RBC # BLD AUTO: 4.88 10E6/UL (ref 4.4–5.9)
SODIUM SERPL-SCNC: 138 MMOL/L (ref 136–145)
WBC # BLD AUTO: 8 10E3/UL (ref 4–11)

## 2022-11-20 PROCEDURE — 87177 OVA AND PARASITES SMEARS: CPT | Performed by: STUDENT IN AN ORGANIZED HEALTH CARE EDUCATION/TRAINING PROGRAM

## 2022-11-20 PROCEDURE — 87209 SMEAR COMPLEX STAIN: CPT | Performed by: STUDENT IN AN ORGANIZED HEALTH CARE EDUCATION/TRAINING PROGRAM

## 2022-11-20 PROCEDURE — 99213 OFFICE O/P EST LOW 20 MIN: CPT | Performed by: STUDENT IN AN ORGANIZED HEALTH CARE EDUCATION/TRAINING PROGRAM

## 2022-11-20 PROCEDURE — 87506 IADNA-DNA/RNA PROBE TQ 6-11: CPT | Performed by: STUDENT IN AN ORGANIZED HEALTH CARE EDUCATION/TRAINING PROGRAM

## 2022-11-20 PROCEDURE — 85025 COMPLETE CBC W/AUTO DIFF WBC: CPT | Performed by: STUDENT IN AN ORGANIZED HEALTH CARE EDUCATION/TRAINING PROGRAM

## 2022-11-20 PROCEDURE — 36415 COLL VENOUS BLD VENIPUNCTURE: CPT | Performed by: STUDENT IN AN ORGANIZED HEALTH CARE EDUCATION/TRAINING PROGRAM

## 2022-11-20 PROCEDURE — 80048 BASIC METABOLIC PNL TOTAL CA: CPT | Performed by: STUDENT IN AN ORGANIZED HEALTH CARE EDUCATION/TRAINING PROGRAM

## 2022-11-20 RX ORDER — ATORVASTATIN CALCIUM 10 MG/1
10 TABLET, FILM COATED ORAL DAILY
COMMUNITY
Start: 2022-07-06

## 2022-11-20 NOTE — PROGRESS NOTES
"  Assessment & Plan     Diarrhea of presumed infectious origin  Well appearing on exam, nontoxic. No fever, pain or hematochezia. Discussed basic labs as well as stool studies to better elucidate cause. Introduced idea of post-infectious diarrhea and that this may take time to resolve. Patient inquired about antibiotics, will await stool tests. Discussed supportive cares, bland diet.     - Basic metabolic panel  (Ca, Cl, CO2, Creat, Gluc, K, Na, BUN)  - CBC with platelets and differential  - Ova and Parasite Exam Routine  - Enteric Bacteria and Virus Panel by NORM Stool             Return in about 1 week (around 11/27/2022) for if not improved.    Joycelyn May MD  [facility] LEAH Ambriz is a 57 year old, presenting for the following health issues:  Gastrointestinal Problem (GI bug, diarrhea, gas, bloating. Ongoing x7 days.  Recent foreign travel. )      HPI   [place] and [place]. Arrived home last night.   Shaking chills Monday night 11/14, resolved by Tuesday. No fever since then.   Ongoing diarrhea 3-4 stools per day. No blood. Frequency of stools has improved since onset.   No abdominal pain apart from intermittent \"gas pain.\"  Tolerating fluids, decreased appetite. No nausea or vomiting.   Half the tour group was also sick but their symptoms resolved more quickly than his.   Only drank bottled water.     Has diabetes, no recent hypoglycemia symptoms. Not on insulin.   Has HIV, virally undetectable for years on appropriate therapy.   No recent antibiotics, no h/o c diff   Was taking imodium for the last couple days, no real improvement.       Review of Systems   Constitutional, HEENT, cardiovascular, pulmonary, gi and gu systems are negative, except as otherwise noted.      Objective    /63 (BP Location: Right arm, Patient Position: Sitting, Cuff Size: Adult Regular)   Pulse 72   Temp 97.9  F (36.6  C) (Oral)   Wt 75.1 kg (165 lb 9.6 oz)   SpO2 97%   BMI 24.25 " kg/m    Body mass index is 24.25 kg/m .  Physical Exam   GENERAL: healthy, alert and no distress  NECK: no adenopathy  RESP: lungs clear to auscultation - no rales, rhonchi or wheezes  CV: regular rate and rhythm, normal S1 S2, no S3 or S4, no murmur, click or rub, no peripheral edema and peripheral pulses strong  ABDOMEN: soft, nontender, no hepatosplenomegaly, no masses and bowel sounds normal  MS: no gross musculoskeletal defects noted, no edema    Labs pending

## 2022-11-20 NOTE — TELEPHONE ENCOUNTER
Nurse Triage SBAR    Is this a 2nd Level Triage? NO    Situation: Diarrhea    Background: Just returned from a trip to [place] and [place]. States that he picked up an intestinal bug while in [place]. States that he has had diarrhea since returning. Has about 3 episodes yesterday and just 1 today so far. States he has abdominal pain right before he has to go but than goes away. States that his stools are more yellow. He denies vomiting. States that he has his appetite back and has been drinking plenty of fluids. He did take imodium for a couple of days. Denies bloody stools.    Assessment: Travellers diarrhea    Protocol Recommended Disposition:   Call PCP Within 24 Hours    Recommendation:     Care advice given per protocol. Patient advised to submit an E-visit. He verbalized understanding and agreement with the plan of care.      Not routed     EUGENIA ROQUE RN      Does the patient meet one of the following criteria for ADS visit consideration? 16+ years old, with an [facility] PCP     TIP  Providers, please consider if this condition is appropriate for management at one of our Acute and Diagnostic Services sites.     If patient is a good candidate, please use dotphrase <dot>triageresponse and select Refer to ADS to document.    Reason for Disposition    Travel to a foreign country in past month    Additional Information    Negative: Shock suspected (e.g., cold/pale/clammy skin, too weak to stand, low BP, rapid pulse)    Negative: Difficult to awaken or acting confused (e.g., disoriented, slurred speech)    Negative: Sounds like a life-threatening emergency to the triager    Negative: Vomiting also present and worse than the diarrhea    Negative: [1] Blood in stool AND [2] without diarrhea    Negative: Diarrhea in a cancer patient who is currently (or recently) receiving chemotherapy or radiation therapy, or cancer patient who has metastatic or end-stage cancer and is receiving palliative care    Negative: [1]  SEVERE abdominal pain (e.g., excruciating) AND [2] present > 1 hour    Negative: [1] SEVERE abdominal pain AND [2] age > 60 years    Negative: [1] Blood in the stool AND [2] moderate or large amount of blood    Negative: Black or tarry bowel movements (Exception: chronic-unchanged black-grey bowel movements AND is taking iron pills or Pepto-bismol)    Negative: [1] Drinking very little AND [2] dehydration suspected (e.g., no urine > 12 hours, very dry mouth, very lightheaded)    Negative: Patient sounds very sick or weak to the triager    Negative: [1] SEVERE diarrhea (e.g., 7 or more times / day more than normal) AND [2] age > 60 years    Negative: [1] Constant abdominal pain AND [2] present > 2 hours    Negative: [1] Fever > 103 F (39.4 C) AND [2] not able to get the fever down using Fever Care Advice    Negative: [1] SEVERE diarrhea (e.g., 7 or more times / day more than normal) AND [2] present > 24 hours (1 day)    Negative: [1] MODERATE diarrhea (e.g., 4-6 times / day more than normal) AND [2] present > 48 hours (2 days)    Negative: [1] MODERATE diarrhea (e.g., 4-6 times / day more than normal) AND [2] age > 70 years    Negative: Fever > 101 F (38.3 C)    Negative: Fever present > 3 days (72 hours)    Negative: Abdominal pain  (Exception: Pain clears with each passage of diarrhea stool)    Negative: [1] Blood in the stool AND [2] small amount of blood   (Exception: only on toilet paper. Reason: diarrhea can cause rectal irritation with blood on wiping)    Negative: [1] Mucus or pus in stool AND [2] present > 2 days AND [3] diarrhea is more than mild    Negative: [1] Recent antibiotic therapy (i.e., within last 2 months) AND [2] diarrhea present > 3 days since antibiotic was stopped    Negative: [1] Recent hospitalization AND [2] diarrhea present > 3 days    Negative: Weak immune system (e.g., HIV positive, cancer chemo, splenectomy, organ transplant, chronic steroids)    Negative: Tube feedings (e.g.,  nasogastric, g-tube, j-tube)    Protocols used: DIARRHEA-A-AH

## 2022-11-20 NOTE — PATIENT INSTRUCTIONS
Nice to meet you Mr. Langston.   Hopefully you will be feeling better soon.   Hold off on further imodium until we get the stool studies back.   If your diarrhea is still lingering by next weekend, please call your PCP to schedule a follow-up visit.   If you develop nausea, vomiting, lightheadedness, please seek evaluation sooner.

## 2022-11-21 LAB — O+P STL MICRO: NEGATIVE

## 2022-11-22 DIAGNOSIS — E11.9 TYPE 2 DIABETES MELLITUS WITHOUT COMPLICATION, WITHOUT LONG-TERM CURRENT USE OF INSULIN (H): ICD-10-CM

## 2022-11-23 NOTE — TELEPHONE ENCOUNTER
"Routing refill request to provider for review/approval because:  Patient needs to be seen because over 1 year since last visit outside of [facility]    Last Written Prescription Date:  10/31/22  Last Fill Quantity: 0.5ml,  # refills: 1   Last office visit provider:  7/21/21     Requested Prescriptions   Pending Prescriptions Disp Refills     dulaglutide (TRULICITY) 0.75 MG/0.5ML pen 0.5 mL 1     Sig: Inject 0.75 mg Subcutaneous every 7 days       GLP-1 Agonists Protocol Failed - 11/22/2022  8:42 AM        Failed - Recent (6 mo) or future (30 days) visit within the authorizing provider's specialty     Patient had office visit in the last 6 months or has a visit in the next 30 days with authorizing provider.  See \"Patient Info\" tab in inbasket, or \"Choose Columns\" in Meds & Orders section of the refill encounter.            Passed - HgbA1C in past 3 or 6 months     If HgbA1C is 8 or greater, it needs to be on file within the past 3 months.  If less than 8, must be on file within the past 6 months.     Recent Labs   Lab Test 09/14/22  0734   A1C 9.4*             Passed - Medication is active on med list        Passed - Patient is age 18 or older        Passed - Normal serum creatinine on file in past 12 months     Recent Labs   Lab Test 11/20/22  1053   CR 1.02       Ok to refill medication if creatinine is low               Lara Velazquez, RN 11/22/22 11:01 PM  "

## 2022-11-25 RX ORDER — DULAGLUTIDE 0.75 MG/.5ML
0.75 INJECTION, SOLUTION SUBCUTANEOUS
Qty: 0.5 ML | Refills: 1 | Status: SHIPPED | OUTPATIENT
Start: 2022-11-25 | End: 2023-01-23

## 2022-11-30 DIAGNOSIS — E11.9 TYPE 2 DIABETES MELLITUS WITHOUT COMPLICATION, WITHOUT LONG-TERM CURRENT USE OF INSULIN (H): ICD-10-CM

## 2022-12-01 RX ORDER — EMPAGLIFLOZIN 25 MG/1
TABLET, FILM COATED ORAL
Qty: 90 TABLET | Refills: 1 | Status: SHIPPED | OUTPATIENT
Start: 2022-12-01 | End: 2023-06-01

## 2022-12-01 NOTE — TELEPHONE ENCOUNTER
"Routing refill request to provider for review/approval because:  Patient needs to be seen because:  q6m    Last Written Prescription Date:  6/3/22  Last Fill Quantity: 90,  # refills: 1   Last office visit provider:  7/21/21     Requested Prescriptions   Pending Prescriptions Disp Refills     JARDIANCE 25 MG TABS tablet [Pharmacy Med Name: JARDIANCE 25MG TABLETS] 90 tablet 1     Sig: TAKE 1 TABLET(25 MG) BY MOUTH DAILY       Sodium Glucose Co-Transport Inhibitor Agents Failed - 11/30/2022  1:49 PM        Failed - Recent (6 mo) or future (30 days) visit within the authorizing provider's specialty     Patient had office visit in the last 6 months or has a visit in the next 30 days with authorizing provider or within the authorizing provider's specialty.  See \"Patient Info\" tab in inbasket, or \"Choose Columns\" in Meds & Orders section of the refill encounter.            Passed - Patient has documented A1c within the specified period of time.     If HgbA1C is 8 or greater, it needs to be on file within the past 3 months.  If less than 8, must be on file within the past 6 months.     Recent Labs   Lab Test 09/14/22  0734   A1C 9.4*             Passed - No creatinine >1.4 or GFR <45 within the past 12 mos     Recent Labs   Lab Test 11/20/22  1053 09/14/22  0734 05/07/21  0729   GFRESTIMATED 85   < > 51*   GFRESTBLACK  --   --  >60    < > = values in this interval not displayed.       Recent Labs   Lab Test 11/20/22  1053   CR 1.02             Passed - Medication is active on med list        Passed - Patient is age 18 or older        Passed - Patient has documented normal Potassium within the last 12 mos.     Recent Labs   Lab Test 11/20/22  1053   POTASSIUM 4.0                  Erma Panda, RN 12/01/22 12:03 PM  "

## 2022-12-19 LAB
C COLI+JEJUNI+LARI FUSA STL QL NAA+PROBE: NOT DETECTED
EC STX1 GENE STL QL NAA+PROBE: NOT DETECTED
EC STX2 GENE STL QL NAA+PROBE: NOT DETECTED
NOROV GI+II ORF1-ORF2 JNC STL QL NAA+PR: NOT DETECTED
RVA NSP5 STL QL NAA+PROBE: NOT DETECTED
SALMONELLA SP RPOD STL QL NAA+PROBE: NOT DETECTED
SHIGELLA SP+EIEC IPAH STL QL NAA+PROBE: DETECTED
V CHOL+PARA RFBL+TRKH+TNAA STL QL NAA+PR: NOT DETECTED
Y ENTERO RECN STL QL NAA+PROBE: NOT DETECTED

## 2023-01-21 DIAGNOSIS — E11.9 TYPE 2 DIABETES MELLITUS WITHOUT COMPLICATION, WITHOUT LONG-TERM CURRENT USE OF INSULIN (H): ICD-10-CM

## 2023-01-23 RX ORDER — DULAGLUTIDE 0.75 MG/.5ML
INJECTION, SOLUTION SUBCUTANEOUS
Qty: 2 ML | Refills: 1 | Status: SHIPPED | OUTPATIENT
Start: 2023-01-23 | End: 2023-03-24

## 2023-01-23 NOTE — TELEPHONE ENCOUNTER
Provider response below relayed to patient. Message understood. [facility] schedule lab [facility]

## 2023-01-23 NOTE — TELEPHONE ENCOUNTER
"Routing refill request to provider for review/approval because:  Labs not current:  a1c    Last Written Prescription Date:  11/25/22  Last Fill Quantity: 0.5,  # refills: 1   Last office visit provider:  7/22/21     Requested Prescriptions   Pending Prescriptions Disp Refills     TRULICITY 0.75 MG/0.5ML pen [Pharmacy Med Name: TRULICITY 0.75MG/0.5ML SDP 0.5ML] 2 mL      Sig: ADMINISTER 0.75 MG UNDER THE SKIN EVERY 7 DAYS       GLP-1 Agonists Protocol Failed - 1/21/2023  5:10 PM        Failed - HgbA1C in past 3 or 6 months     If HgbA1C is 8 or greater, it needs to be on file within the past 3 months.  If less than 8, must be on file within the past 6 months.     Recent Labs   Lab Test 09/14/22  0734   A1C 9.4*             Failed - Recent (6 mo) or future (30 days) visit within the authorizing provider's specialty     Patient had office visit in the last 6 months or has a visit in the next 30 days with authorizing provider.  See \"Patient Info\" tab in inbasket, or \"Choose Columns\" in Meds & Orders section of the refill encounter.            Passed - Medication is active on med list        Passed - Patient is age 18 or older        Passed - Normal serum creatinine on file in past 12 months     Recent Labs   Lab Test 11/20/22  1053   CR 1.02       Ok to refill medication if creatinine is low               Erma Panda RN 01/22/23 9:01 PM  "

## 2023-01-24 ENCOUNTER — OFFICE VISIT (OUTPATIENT)
Dept: FAMILY MEDICINE | Facility: CLINIC | Age: 58
End: 2023-01-24
Payer: COMMERCIAL

## 2023-01-24 VITALS
WEIGHT: 168.6 LBS | DIASTOLIC BLOOD PRESSURE: 85 MMHG | BODY MASS INDEX: 24.69 KG/M2 | SYSTOLIC BLOOD PRESSURE: 123 MMHG | HEART RATE: 76 BPM | RESPIRATION RATE: 16 BRPM | OXYGEN SATURATION: 96 % | TEMPERATURE: 97.9 F

## 2023-01-24 DIAGNOSIS — B30.9 VIRAL CONJUNCTIVITIS: Primary | ICD-10-CM

## 2023-01-24 PROCEDURE — 99213 OFFICE O/P EST LOW 20 MIN: CPT | Performed by: PHYSICIAN ASSISTANT

## 2023-01-24 RX ORDER — TOBRAMYCIN 3 MG/ML
1-2 SOLUTION/ DROPS OPHTHALMIC 4 TIMES DAILY
Qty: 5 ML | Refills: 0 | Status: SHIPPED | OUTPATIENT
Start: 2023-01-24 | End: 2024-08-13

## 2023-01-24 RX ORDER — TOBRAMYCIN 3 MG/ML
1-2 SOLUTION/ DROPS OPHTHALMIC 4 TIMES DAILY
Qty: 5 ML | Refills: 0 | Status: SHIPPED | OUTPATIENT
Start: 2023-01-24 | End: 2023-01-24

## 2023-01-24 NOTE — PROGRESS NOTES
Patient presents with:  Eye Problem: X yesterday. Both eye. Irration. Burning. Watery.       Clinical Decision Making:  Suspect viral conjunctivitis.  Patient was given a paper prescription for tobramycin in case symptoms suddenly worsen.  We discussed supportive cares.      ICD-10-CM    1. Viral conjunctivitis  B30.9 tobramycin (TOBREX) 0.3 % ophthalmic solution     DISCONTINUED: tobramycin (TOBREX) 0.3 % ophthalmic solution          Patient Instructions   1) Warm compress with a clean wet washcloth to affected eye.  2) If eye begins to have a thick discharge fill eye drop prescription and administer 1 drop to the affected eye four times a day.   3) Practice good hand hygiene. Avoid sharing linens such as pillowcases, towels, or wash clothes. Wash these items on hot to prevent spreading.  4) Follow up if symptoms worsen or if not getting better within 4 days.  5) Refresh eye drop brand is a better moisturizer than visine.          HPI:  Pb Langston is a 57 year old male who presents today complaining of bilateal burning and watery eye irritation that started yesterday.  Yesterday he had thick discharge, but this morning his eyes were not matted shut and there has been less discharge.  No vision changes, severe eye pain, photosensitivity, or foreign body sensation.  He does not wear contacts.    History obtained from the patient.    Problem List:  2022-10: Cholelithiasis  2022-10: Hyperlipidemia  2022-10: Recurrent umbilical hernia  2019-11: Benign prostatic hyperplasia  2019-11: Essential (primary) hypertension  2018-12: Peripheral neuropathy  2018-04: Benign neoplasm of transverse colon  2018-04: Diverticular disease of large intestine  2016-06: Obstructive sleep apnea (adult) (pediatric)  2015-10: Diaphragmatic hernia  2010-09: Diabetes mellitus (H)  2009-12: Gastroesophageal reflux disease without esophagitis  Fatigue  Limb pain  Type 2 diabetes mellitus without complication (H)  Difficulty Breathing  (Dyspnea)  HIV infection (H)      Past Medical History:   Diagnosis Date     Arthritis     Osteoarthritis     Sandoval's esophagus      DM (diabetes mellitus) (H)      GERD (gastroesophageal reflux disease)      HIV (human immunodeficiency virus infection) (H) 1991     HLD (hyperlipidemia)      HTN (hypertension)      MARIE (obstructive sleep apnea)        Social History     Tobacco Use     Smoking status: Never     Smokeless tobacco: Never   Substance Use Topics     Alcohol use: Yes     Comment: Alcoholic Drinks/day: Occasionally       Review of Systems    Vitals:    01/24/23 1015   BP: 123/85   BP Location: Right arm   Patient Position: Sitting   Cuff Size: Adult Regular   Pulse: 76   Resp: 16   Temp: 97.9  F (36.6  C)   TempSrc: Oral   SpO2: 96%   Weight: 76.5 kg (168 lb 9.6 oz)       Physical Exam  Vitals and nursing note reviewed.   Constitutional:       General: He is not in acute distress.     Appearance: He is not toxic-appearing or diaphoretic.   HENT:      Head: Normocephalic and atraumatic.      Right Ear: External ear normal.      Left Ear: External ear normal.   Eyes:      General: Lids are normal.         Right eye: No discharge.         Left eye: No discharge.      Extraocular Movements: Extraocular movements intact.      Conjunctiva/sclera:      Left eye: Left conjunctiva is injected.      Pupils: Pupils are equal, round, and reactive to light.   Pulmonary:      Effort: Pulmonary effort is normal. No respiratory distress.   Neurological:      Mental Status: He is alert.   Psychiatric:         Mood and Affect: Mood normal.         Behavior: Behavior normal.         Thought Content: Thought content normal.         Judgment: Judgment normal.       At the end of the encounter, I discussed results, diagnosis, medications. Discussed red flags for immediate return to clinic/ER, as well as indications for follow up if no improvement. Patient understood and agreed to plan. Patient was stable for discharge.

## 2023-01-24 NOTE — PATIENT INSTRUCTIONS
1) Warm compress with a clean wet washcloth to affected eye.  2) If eye begins to have a thick discharge fill eye drop prescription and administer 1 drop to the affected eye four times a day.   3) Practice good hand hygiene. Avoid sharing linens such as pillowcases, towels, or wash clothes. Wash these items on hot to prevent spreading.  4) Follow up if symptoms worsen or if not getting better within 4 days.  5) Refresh eye drop brand is a better moisturizer than visine.

## 2023-02-02 ENCOUNTER — LAB (OUTPATIENT)
Dept: LAB | Facility: CLINIC | Age: 58
End: 2023-02-02
Payer: COMMERCIAL

## 2023-02-02 DIAGNOSIS — E11.9 TYPE 2 DIABETES MELLITUS WITHOUT COMPLICATION, WITHOUT LONG-TERM CURRENT USE OF INSULIN (H): ICD-10-CM

## 2023-02-02 LAB
ALT SERPL W P-5'-P-CCNC: 20 U/L (ref 10–50)
ANION GAP SERPL CALCULATED.3IONS-SCNC: 13 MMOL/L (ref 7–15)
BUN SERPL-MCNC: 22.4 MG/DL (ref 8–23)
CALCIUM SERPL-MCNC: 9.5 MG/DL (ref 8.6–10)
CHLORIDE SERPL-SCNC: 101 MMOL/L (ref 98–107)
CHOLEST SERPL-MCNC: 184 MG/DL
CREAT SERPL-MCNC: 1.1 MG/DL (ref 0.67–1.17)
DEPRECATED HCO3 PLAS-SCNC: 23 MMOL/L (ref 22–29)
GFR SERPL CREATININE-BSD FRML MDRD: 77 ML/MIN/1.73M2
GLUCOSE SERPL-MCNC: 223 MG/DL (ref 70–99)
HBA1C MFR BLD: 8.5 % (ref 0–5.6)
HDLC SERPL-MCNC: 37 MG/DL
LDLC SERPL CALC-MCNC: 73 MG/DL
NONHDLC SERPL-MCNC: 147 MG/DL
POTASSIUM SERPL-SCNC: 4.6 MMOL/L (ref 3.4–5.3)
SODIUM SERPL-SCNC: 137 MMOL/L (ref 136–145)
TRIGL SERPL-MCNC: 371 MG/DL

## 2023-02-02 PROCEDURE — 80048 BASIC METABOLIC PNL TOTAL CA: CPT

## 2023-02-02 PROCEDURE — 84460 ALANINE AMINO (ALT) (SGPT): CPT

## 2023-02-02 PROCEDURE — 83036 HEMOGLOBIN GLYCOSYLATED A1C: CPT

## 2023-02-02 PROCEDURE — 80061 LIPID PANEL: CPT

## 2023-02-02 PROCEDURE — 36415 COLL VENOUS BLD VENIPUNCTURE: CPT

## 2023-02-03 NOTE — LETTER
2019     RE: Pb Langston  [address] [place] [id number]     Dear Colleague,    Thank you for referring your patient, Pb Langston, to the [facility]. Please see a copy of my visit note below.        [facility] Hematology Consultation    Outpatient Visit Note:    Patient: Pb Langston  MRN: [id number]  : 1965  JOHANNA: 2019    Reason for Consultation:  Pb Langston is for evaluation and treatment of monoclonal gammopathy     History of Present Illness:  Pb Langston is a 53 year old male with HIV and syndrome X who had been complaining of peripheral neuropathy and underwent further evaluation. He states that he has been experiencing night time sharp pain in leg that progressed to arms and now top of his scalp and his PCP did further work up that found a barely detectable monoclonal spike of Ig M Kappa. He was sent to me for further evaluation and discussion to see if this is contributing to the neuropathy that the patient is experiencing.     Pt denies SOB , cough, chest pain , fevers , chills, nausea , vomiting , abdominal pain, change in bowel / bladder habits,   No c/o sore throat , mucositis,Lowe extremity swelling ,  bleeding gums, tingling or numbness, easy bruising , muscular weakness,  weight loss. Pt has not noticed any new swelling/ lymph glands .  He states that he is a bit fatigued but nothing out of proportion.         Past Medical History:  Past Medical History:   Diagnosis Date     Sandoval's esophagus      DM (diabetes mellitus) (H)      GERD (gastroesophageal reflux disease)      HIV (human immunodeficiency virus infection) (H)      HLD (hyperlipidemia)      HTN (hypertension)      MARIE (obstructive sleep apnea)        Past Surgical History:  Past Surgical History:   Procedure Laterality Date     BONE REPLACEMENT GRAFT      scaphoid graft      CHOLECYSTECTOMY       EXCISE CYST THYROGLOSSAL DUCT       JOINT REPLACEMENT    "      Medications:  Current Outpatient Medications   Medication Sig Dispense Refill     darunavir-cobicistat (PREZCOBIX) 800-150 MG per tablet Take 800 mg by mouth       dolutegravir (TIVICAY) 50 MG tablet Take 50 mg by mouth       DESCOVY 200-25 MG per tablet TK 1 T PO D  1     fenofibrate (TRICOR) 145 MG tablet Take 145 mg by mouth       glimepiride (AMARYL) 2 MG tablet   10     JARDIANCE 10 MG TABS tablet   11     lisinopril (PRINIVIL/ZESTRIL) 5 MG tablet   1     omeprazole (PRILOSEC) 20 MG DR capsule   0     terazosin (HYTRIN) 2 MG capsule   3     valACYclovir (VALTREX) 500 MG tablet   0        Allergies:  Allergies   Allergen Reactions     Cephalexin Hives     \"severe hives\"       Cephalosporins      Severe hives with keflex     Codeine Nausea     Oxycodone Nausea and Vomiting     Penicillins Itching, Rash and Unknown     rash       Sulfa Drugs Unknown, Itching and Rash     rash         ROS:  A 14 point ROS is negative except as stated in the HPI    Social History:  Denies any tobacco use. No significant alcohol use. Denies any illicit drug use. Patient works as a computer      Family History:  Family History   Problem Relation Age of Onset     Cancer Father         prostate      Cancer Nephew         leukemia      Cancer Paternal Uncle         leukemia      Cancer Maternal Aunt         unknown          Objective:  Vitals: B/P: 130/84, T: 97, P: 64, R: 16, Wt: 175 lbs 12.8 oz There is no height or weight on file to calculate BMI.   Exam:   Gen: Appears well, no distress  HEENT: no scleral icterus or hemorrhage, no wet purpura, no lymphadenopathy  CV: regular, no murmurs  Pulm: clear, dec BS at the R lung base.   Abd: soft, nontender, no splenomegaly  Ext: no edema  Skin: no ecchymoses or hematomas  Neuro: no focal deficits, affect and cognition are normal    Labs:  Reviewed OSH labs in Care everywhere     Imaging:  None     Assessment:  In summary, Pb Langston is a 53 year old male with HIV " and other co morbidities being evaluated for Monoclonal gammopathy     Plan:  1. Majority of today's visit was spent counseling the patient regarding Monoclonal gammopathy and his peripheral neuropathy    We discussed MGUS ( possibly related to his HIV)  and Ig M spike and how this could also be WM.   We will send labs   Orders Placed This Encounter   Procedures     Comprehensive metabolic panel     CBC with platelets differential     Protein electrophoresis     Protein Immunofixation Serum     Free Kappa and Lambda Light Chains Urine     [place] and lambda light chain     Protein electrophoresis timed urine     Protein immunofixation urine     Beta 2 microglobulin     Lactate Dehydrogenase     I will see him back in 2 weeks to review the labs     Peripheral neuropathy - could be related to his other comorbidies and meds from his HIV.     The patient is given our center's contact information and is instructed to call if he should have any further questions or concerns.      I spent 45 minutes in the care of this patient >50% of which was spent in coordinating and counseling.  Vania Marti   of Medicine   Hematology and medical Oncology   [facility]       70

## 2023-03-03 ENCOUNTER — OFFICE VISIT (OUTPATIENT)
Dept: FAMILY MEDICINE | Facility: CLINIC | Age: 58
End: 2023-03-03
Payer: COMMERCIAL

## 2023-03-03 VITALS
DIASTOLIC BLOOD PRESSURE: 82 MMHG | BODY MASS INDEX: 24.37 KG/M2 | WEIGHT: 170.25 LBS | OXYGEN SATURATION: 98 % | RESPIRATION RATE: 16 BRPM | HEART RATE: 62 BPM | TEMPERATURE: 97.5 F | SYSTOLIC BLOOD PRESSURE: 130 MMHG | HEIGHT: 70 IN

## 2023-03-03 DIAGNOSIS — M25.561 CHRONIC PAIN OF RIGHT KNEE: ICD-10-CM

## 2023-03-03 DIAGNOSIS — M25.511 CHRONIC RIGHT SHOULDER PAIN: ICD-10-CM

## 2023-03-03 DIAGNOSIS — E11.9 TYPE 2 DIABETES MELLITUS WITHOUT COMPLICATION, WITHOUT LONG-TERM CURRENT USE OF INSULIN (H): ICD-10-CM

## 2023-03-03 DIAGNOSIS — G89.29 CHRONIC RIGHT SHOULDER PAIN: ICD-10-CM

## 2023-03-03 DIAGNOSIS — Z00.00 PREVENTATIVE HEALTH CARE: ICD-10-CM

## 2023-03-03 DIAGNOSIS — E11.40 CONTROLLED TYPE 2 DIABETES MELLITUS WITH DIABETIC NEUROPATHY, WITHOUT LONG-TERM CURRENT USE OF INSULIN (H): Primary | ICD-10-CM

## 2023-03-03 DIAGNOSIS — Z21 ASYMPTOMATIC HIV INFECTION, WITH NO HISTORY OF HIV-RELATED ILLNESS (H): ICD-10-CM

## 2023-03-03 DIAGNOSIS — G89.29 CHRONIC PAIN OF RIGHT KNEE: ICD-10-CM

## 2023-03-03 PROCEDURE — 99215 OFFICE O/P EST HI 40 MIN: CPT | Performed by: FAMILY MEDICINE

## 2023-03-03 NOTE — PROGRESS NOTES
Assessment & Plan     Controlled type 2 diabetes mellitus with diabetic neuropathy, without long-term current use of insulin (H)  He and his partner are working hard at a markedly better lifestyle. This is greatly helping the diabetes control. We'll see in the next 3 months how that goes. Will have MT pharmacist help with DM mgmt, too.    Chronic right shoulder pain  Check xray, try diclofenac gel.  - XR Shoulder Left G/E 3 Views  - diclofenac (VOLTAREN) 1 % topical gel  Dispense: 100 g; Refill: 4  - Med Therapy Management Referral    Preventative health care  reviewed  - REVIEW OF HEALTH MAINTENANCE PROTOCOL ORDERS    Chronic pain of right knee  Again, try diclofenac gel. If that does not help much, then might try injfection.  - diclofenac (VOLTAREN) 1 % topical gel  Dispense: 100 g; Refill: 4  - Med Therapy Management Referral    Type 2 diabetes mellitus without complication, without long-term current use of insulin (H)  As above  - Med Therapy Management Referral    Asymptomatic HIV infection, with no history of HIV-related illness (H)  Continue same meds  - Med Therapy Management Referral      Review of external notes as documented elsewhere in note  Ordering of each unique test  Prescription drug management  55 minutes spent on the date of the encounter doing chart review, history and exam, documentation and further activities per the note    Return in about 6 months (around 9/3/2023) for Follow up.    Nuha Paredes MD  [facility] CLAUDETTE Ambriz is a 57 year old, presenting for the following health issues:  Follow Up (A1c and injury)    History of Present Illness       Diabetes:   He presents for follow up of diabetes.  He is checking home blood glucose one time daily. He checks blood glucose before meals.  Blood glucose is sometimes over 200 and never under 70. He is aware of hypoglycemia symptoms including shakiness. He is concerned about blood sugar frequently  "over 200.  He is having numbness in feet. The patient has had a diabetic eye exam in the last 12 months.         He eats 2-3 servings of fruits and vegetables daily.He consumes 0 sweetened beverage(s) daily. He exercises with enough effort to increase his heart rate 3 or less days per week.   He is taking medications regularly.     Eating better - more veggies  He is the main cooking - now does fresh; also portion control  Biggest downfall is salty snacks - Doritos, pretzels, pretzels    Loves playing sports; hates exercising. Currently doing walking. Knows it's not enough. Is in line for another knee replacement. Last night had more knee pain.    Sedentary job - sits all day.  Plays with dog.    Checks morning blood sugar. That is his barometer.    ecoli fall 2022 - caused him to lose weight  covid    General health is good -  Pink eye recently. Still feels something is not healed. He goes to [facility] for his eye care. He was not sure what / where to go for help.    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 Has worked well  Dry feet during the winter. He uses lotion on his feet.    Left shoulder pain - posteriorly 6 mon of pain, takes prn ibuprofen    Left hand bump - ganglion 2 weeks - it does not hurt generally, but last weekend when they went dog-sledding and he had to hold onto a bar for a prolonged time, then it hurt.    Review of Systems         Objective    /82   Pulse 62   Temp 97.5  F (36.4  C) (Oral)   Resp 16   Ht 1.77 m (5' 9.69\")   Wt 77.2 kg (170 lb 4 oz)   SpO2 98%   BMI 24.65 kg/m    Body mass index is 24.65 kg/m .  Physical Exam   GENERAL: healthy, alert and no distress  RESP: lungs clear to auscultation - no rales, rhonchi or wheezes  CV: regular rate and rhythm, normal S1 S2, no S3 or S4, no murmur, click or rub, no peripheral edema and peripheral pulses strong  MS: no gross musculoskeletal defects noted, no edema; left hand in the palmar side has a small somewhat firm lump. The lump does " not move with finger movement and there is no catching of the finger with flexion/extension. It is nontender.  SKIN: no suspicious lesions or rashes  PSYCH: mentation appears normal, affect normal/bright    Lab on 02/02/2023   Component Date Value Ref Range Status     Hemoglobin A1C 02/02/2023 8.5 (H)  0.0 - 5.6 % Final    Normal <5.7%   Prediabetes 5.7-6.4%    Diabetes 6.5% or higher     Note: Adopted from ADA consensus guidelines.     Sodium 02/02/2023 137  136 - 145 mmol/L Final     Potassium 02/02/2023 4.6  3.4 - 5.3 mmol/L Final     Chloride 02/02/2023 101  98 - 107 mmol/L Final     Carbon Dioxide (CO2) 02/02/2023 23  22 - 29 mmol/L Final     Anion Gap 02/02/2023 13  7 - 15 mmol/L Final     Urea Nitrogen 02/02/2023 22.4  8.0 - 23.0 mg/dL Final     Creatinine 02/02/2023 1.10  0.67 - 1.17 mg/dL Final     Calcium 02/02/2023 9.5  8.6 - 10.0 mg/dL Final     Glucose 02/02/2023 223 (H)  70 - 99 mg/dL Final     GFR Estimate 02/02/2023 77  >60 mL/min/1.73m2 Final    eGFR calculated using 2021 CKD-EPI equation.     ALT 02/02/2023 20  10 - 50 U/L Final     Cholesterol 02/02/2023 184  <200 mg/dL Final     Triglycerides 02/02/2023 371 (H)  <150 mg/dL Final     Direct Measure HDL 02/02/2023 37 (L)  >=40 mg/dL Final     LDL Cholesterol Calculated 02/02/2023 73  <=100 mg/dL Final     Non HDL Cholesterol 02/02/2023 147 (H)  <130 mg/dL Final     No results found for any visits on 03/03/23.

## 2023-03-06 ENCOUNTER — ANCILLARY PROCEDURE (OUTPATIENT)
Dept: GENERAL RADIOLOGY | Facility: CLINIC | Age: 58
End: 2023-03-06
Attending: FAMILY MEDICINE
Payer: COMMERCIAL

## 2023-03-06 ENCOUNTER — APPOINTMENT (OUTPATIENT)
Dept: LAB | Facility: CLINIC | Age: 58
End: 2023-03-06
Payer: COMMERCIAL

## 2023-03-06 DIAGNOSIS — M25.511 CHRONIC RIGHT SHOULDER PAIN: ICD-10-CM

## 2023-03-06 DIAGNOSIS — G89.29 CHRONIC RIGHT SHOULDER PAIN: ICD-10-CM

## 2023-03-06 PROCEDURE — 73030 X-RAY EXAM OF SHOULDER: CPT | Mod: TC | Performed by: RADIOLOGY

## 2023-03-07 ENCOUNTER — TELEPHONE (OUTPATIENT)
Dept: FAMILY MEDICINE | Facility: CLINIC | Age: 58
End: 2023-03-07
Payer: COMMERCIAL

## 2023-03-07 NOTE — TELEPHONE ENCOUNTER
MTM referral from: [place] clinic visit (referral by provider) for complex med mgmt with diabetes and HIV care    MTM referral outreach attempt #2 on March 7, 2023 at 10:52 AM      Outcome: Patient not reachable after several attempts, will route to MTM Pharmacist/Provider as an FYI.  [facility] scheduling number is [phone].  Thank you for the referral.    Georgiana Mario, [facility]     Patient has Private Pay coveragepri

## 2023-03-24 ENCOUNTER — MYC MEDICAL ADVICE (OUTPATIENT)
Dept: FAMILY MEDICINE | Facility: CLINIC | Age: 58
End: 2023-03-24
Payer: COMMERCIAL

## 2023-05-23 ENCOUNTER — NURSE TRIAGE (OUTPATIENT)
Dept: FAMILY MEDICINE | Facility: CLINIC | Age: 58
End: 2023-05-23
Payer: COMMERCIAL

## 2023-05-23 ENCOUNTER — MYC MEDICAL ADVICE (OUTPATIENT)
Dept: FAMILY MEDICINE | Facility: CLINIC | Age: 58
End: 2023-05-23
Payer: COMMERCIAL

## 2023-05-23 DIAGNOSIS — E11.9 TYPE 2 DIABETES MELLITUS WITHOUT COMPLICATION, WITHOUT LONG-TERM CURRENT USE OF INSULIN (H): ICD-10-CM

## 2023-05-23 DIAGNOSIS — R20.0 LEG NUMBNESS: Primary | ICD-10-CM

## 2023-05-23 DIAGNOSIS — Z21 ASYMPTOMATIC HIV INFECTION, WITH NO HISTORY OF HIV-RELATED ILLNESS (H): ICD-10-CM

## 2023-05-23 NOTE — TELEPHONE ENCOUNTER
Called pt and relayed message. Pt verbalized understanding. Vascular clinic phone number provided for pt.    appt scheduled with Dr. Paredes on 5/31/23.      YANETH Taylor CemN RN  [facility]

## 2023-05-23 NOTE — TELEPHONE ENCOUNTER
I would like to evaluate this AND refer him given these symptoms.  1) I would like him to do a CTA (a CT scan looking at the arteries going into the legs). This is ordered. He can call and schedule it at [facility] or [facility] any time.    2) I would like him to see a vascular surgeon for an opinion on what is going on. Even if he does not want or need surgery at this time, this referral is helpful. (please give him the number to call to set this appointment up).    thanks

## 2023-05-23 NOTE — TELEPHONE ENCOUNTER
"S-(situation): Mychart message  \"Dr. Paredes,     I ve started having some pain and numbness in my right leg.  It goes down the full length of my leg.  At first I thought it was the neuropathy but it goes up too high.  The other thought was my knee but again it is the whole leg.       Should I get in to see you or get some scans done?     Thanks     Pb\"      B-(background):   - pt stated pain started a few days ago  - he has been busy doing a lot of yard work.    A-(assessment):   - pain at 4/10 on right leg with numbness  - no fever  - no chest pain  - no SOB  - no back pain  - no rash or swelling  - pt stated it allen snot interfere with normal activities. He is able to walk normally. \"it is just uncomfortable\"    R-(recommendations):   - per protocol, pt should be seen today or tomorrow.  - pt stated he will try to see his chiropractor tomorrow.  - pt would also like advise and recommendation from Dr. Paredes.       Zaid Pearson, BSN RN  [facility]        Reason for Disposition    Numbness in a leg or foot (i.e., loss of sensation)    Additional Information    Negative: Looks like a broken bone or dislocated joint (e.g., crooked or deformed)    Negative: Sounds like a life-threatening emergency to the triager    Negative: Followed a hip injury    Negative: Followed a knee injury    Negative: Followed an ankle or foot injury    Negative: Back pain radiating (shooting) into leg(s)    Negative: Foot pain is main symptom    Negative: Ankle pain is main symptom    Negative: Knee pain is main symptom    Negative: Leg swelling is main symptom    Negative: Chest pain    Negative: Difficulty breathing    Negative: Entire foot is cool or blue in comparison to other side    Negative: Unable to walk    Negative: Fever and red area (or area very tender to touch)    Negative: Swollen joint and fever    Negative: Thigh or calf pain in only one leg and present > 1 hour    Negative: Thigh, calf, or ankle " "swelling in only one leg    Negative: Thigh, calf, or ankle swelling in both legs, but one side is definitely more swollen    Negative: History of prior 'blood clot' in leg or lungs (i.e., deep vein thrombosis, pulmonary embolism)    Negative: History of inherited increased risk of blood clots (e.g., factor 5 Leiden, antithrombin 3, protein C or protein S deficiency, prothrombin mutation)    Negative: Major surgery in past month    Negative: Hip or leg fracture (broken bone) in past month (or had cast on leg or ankle in past month)    Negative: Illness requiring prolonged bedrest in past month (e.g., immobilization, long hospital stay)    Negative: Long-distance travel in past month (e.g., car, bus, train, plane; with trip lasting 6 or more hours)    Negative: Cancer treatment in past six months (or has cancer now)    Negative: Patient sounds very sick or weak to the triager    Negative: SEVERE pain (e.g., excruciating, unable to do any normal activities)    Negative: Cast on leg or ankle and now has increasing pain    Negative: Red area or streak > 2 inches (or 5 cm)    Negative: Painful rash with multiple small blisters grouped together (i.e., dermatomal distribution or 'band' or 'stripe')    Negative: Looks like a boil, infected sore, deep ulcer, or other infected rash (spreading redness, pus)    Negative: Localized rash is very painful (no fever)    Answer Assessment - Initial Assessment Questions  1. ONSET: \"When did the pain start?\"       A few days ago    2. LOCATION: \"Where is the pain located?\"       Right leg (whole leg)    3. PAIN: \"How bad is the pain?\"    (Scale 1-10; or mild, moderate, severe)    -  MILD (1-3): doesn't interfere with normal activities     -  MODERATE (4-7): interferes with normal activities (e.g., work or school) or awakens from sleep, limping     -  SEVERE (8-10): excruciating pain, unable to do any normal activities, unable to walk      Pain is at a 4/10. Able to walk normally. No " "limping    4. WORK OR EXERCISE: \"Has there been any recent work or exercise that involved this part of the body?\"       Yard work     5. CAUSE: \"What do you think is causing the leg pain?\"      Not sure    6. OTHER SYMPTOMS: \"Do you have any other symptoms?\" (e.g., chest pain, back pain, breathing difficulty, swelling, rash, fever, numbness, weakness)      Numbness    7. PREGNANCY: \"Is there any chance you are pregnant?\" \"When was your last menstrual period?\"      no    Protocols used: LEG PAIN-A-OH      "

## 2023-06-04 ENCOUNTER — HEALTH MAINTENANCE LETTER (OUTPATIENT)
Age: 58
End: 2023-06-04

## 2023-06-22 ENCOUNTER — HOSPITAL ENCOUNTER (OUTPATIENT)
Dept: CT IMAGING | Facility: HOSPITAL | Age: 58
Discharge: HOME | End: 2023-06-22
Attending: FAMILY MEDICINE | Admitting: FAMILY MEDICINE
Payer: COMMERCIAL

## 2023-06-22 DIAGNOSIS — R20.0 LEG NUMBNESS: ICD-10-CM

## 2023-06-22 DIAGNOSIS — Z21 ASYMPTOMATIC HIV INFECTION, WITH NO HISTORY OF HIV-RELATED ILLNESS (H): ICD-10-CM

## 2023-06-22 DIAGNOSIS — E11.9 TYPE 2 DIABETES MELLITUS WITHOUT COMPLICATION, WITHOUT LONG-TERM CURRENT USE OF INSULIN (H): ICD-10-CM

## 2023-06-22 LAB
CREAT BLD-MCNC: 1.3 MG/DL (ref 0.7–1.3)
GFR SERPL CREATININE-BSD FRML MDRD: >60 ML/MIN/1.73M2

## 2023-06-22 PROCEDURE — 250N000011 HC RX IP 250 OP 636: Performed by: FAMILY MEDICINE

## 2023-06-22 PROCEDURE — 82565 ASSAY OF CREATININE: CPT

## 2023-06-22 PROCEDURE — 75635 CT ANGIO ABDOMINAL ARTERIES: CPT

## 2023-06-22 RX ORDER — IOPAMIDOL 755 MG/ML
90 INJECTION, SOLUTION INTRAVASCULAR ONCE
Status: COMPLETED | OUTPATIENT
Start: 2023-06-22 | End: 2023-06-22

## 2023-06-22 RX ADMIN — IOPAMIDOL 90 ML: 755 INJECTION, SOLUTION INTRAVENOUS at 08:44

## 2023-06-26 ENCOUNTER — MYC MEDICAL ADVICE (OUTPATIENT)
Dept: FAMILY MEDICINE | Facility: CLINIC | Age: 58
End: 2023-06-26
Payer: COMMERCIAL

## 2023-09-05 DIAGNOSIS — Z96.652 STATUS POST TOTAL LEFT KNEE REPLACEMENT: Primary | ICD-10-CM

## 2023-09-05 RX ORDER — DOLUTEGRAVIR SODIUM AND RILPIVIRINE HYDROCHLORIDE 50; 25 MG/1; MG/1
1 TABLET, FILM COATED ORAL DAILY
COMMUNITY

## 2023-09-05 RX ORDER — AZITHROMYCIN 600 MG/1
TABLET, FILM COATED ORAL
Qty: 1 TABLET | Refills: 3 | Status: SHIPPED | OUTPATIENT
Start: 2023-09-05 | End: 2024-10-03

## 2023-09-05 RX ORDER — FAMOTIDINE 20 MG/1
20 TABLET, FILM COATED ORAL DAILY
COMMUNITY
Start: 2023-05-30

## 2023-10-08 ENCOUNTER — HEALTH MAINTENANCE LETTER (OUTPATIENT)
Age: 58
End: 2023-10-08

## 2023-10-11 DIAGNOSIS — Z71.84 TRAVEL ADVICE ENCOUNTER: Primary | ICD-10-CM

## 2023-11-15 ENCOUNTER — MYC REFILL (OUTPATIENT)
Dept: FAMILY MEDICINE | Facility: CLINIC | Age: 58
End: 2023-11-15
Payer: COMMERCIAL

## 2023-11-15 DIAGNOSIS — I10 ESSENTIAL (PRIMARY) HYPERTENSION: ICD-10-CM

## 2023-11-15 DIAGNOSIS — E11.40 CONTROLLED TYPE 2 DIABETES MELLITUS WITH DIABETIC NEUROPATHY, WITHOUT LONG-TERM CURRENT USE OF INSULIN (H): Primary | ICD-10-CM

## 2023-11-15 RX ORDER — LISINOPRIL 5 MG/1
5 TABLET ORAL DAILY
Qty: 90 TABLET | Refills: 4 | Status: SHIPPED | OUTPATIENT
Start: 2023-11-15 | End: 2024-10-03 | Stop reason: DRUGHIGH

## 2023-11-15 NOTE — TELEPHONE ENCOUNTER
See My Chart message from patient.     Medication is historical.     Nyla Wilkerson RN BSN  [facility]

## 2024-04-23 DIAGNOSIS — E11.9 TYPE 2 DIABETES MELLITUS WITHOUT COMPLICATION, WITHOUT LONG-TERM CURRENT USE OF INSULIN (H): ICD-10-CM

## 2024-04-24 ENCOUNTER — MYC REFILL (OUTPATIENT)
Dept: FAMILY MEDICINE | Facility: CLINIC | Age: 59
End: 2024-04-24
Payer: COMMERCIAL

## 2024-04-24 DIAGNOSIS — E11.9 TYPE 2 DIABETES MELLITUS WITHOUT COMPLICATION, WITHOUT LONG-TERM CURRENT USE OF INSULIN (H): ICD-10-CM

## 2024-04-24 RX ORDER — DULAGLUTIDE 0.75 MG/.5ML
0.75 INJECTION, SOLUTION SUBCUTANEOUS
Qty: 2 ML | Refills: 11 | OUTPATIENT
Start: 2024-04-24

## 2024-04-24 RX ORDER — DULAGLUTIDE 0.75 MG/.5ML
INJECTION, SOLUTION SUBCUTANEOUS
Qty: 2 ML | Refills: 11 | Status: SHIPPED | OUTPATIENT
Start: 2024-04-24 | End: 2024-08-13

## 2024-04-26 DIAGNOSIS — E11.9 TYPE 2 DIABETES MELLITUS WITHOUT COMPLICATION, WITHOUT LONG-TERM CURRENT USE OF INSULIN (H): ICD-10-CM

## 2024-04-26 RX ORDER — GLIMEPIRIDE 4 MG/1
TABLET ORAL
Qty: 180 TABLET | Refills: 4 | Status: SHIPPED | OUTPATIENT
Start: 2024-04-26

## 2024-05-05 ENCOUNTER — HEALTH MAINTENANCE LETTER (OUTPATIENT)
Age: 59
End: 2024-05-05

## 2024-05-06 ENCOUNTER — MYC MEDICAL ADVICE (OUTPATIENT)
Dept: FAMILY MEDICINE | Facility: CLINIC | Age: 59
End: 2024-05-06
Payer: COMMERCIAL

## 2024-05-08 ENCOUNTER — OFFICE VISIT (OUTPATIENT)
Dept: FAMILY MEDICINE | Facility: CLINIC | Age: 59
End: 2024-05-08
Payer: COMMERCIAL

## 2024-05-08 VITALS
RESPIRATION RATE: 16 BRPM | SYSTOLIC BLOOD PRESSURE: 113 MMHG | OXYGEN SATURATION: 97 % | TEMPERATURE: 97.2 F | HEIGHT: 70 IN | BODY MASS INDEX: 24.63 KG/M2 | WEIGHT: 172.04 LBS | HEART RATE: 77 BPM | DIASTOLIC BLOOD PRESSURE: 72 MMHG

## 2024-05-08 DIAGNOSIS — K80.20 CALCULUS OF GALLBLADDER WITHOUT CHOLECYSTITIS WITHOUT OBSTRUCTION: ICD-10-CM

## 2024-05-08 DIAGNOSIS — I10 ESSENTIAL (PRIMARY) HYPERTENSION: ICD-10-CM

## 2024-05-08 DIAGNOSIS — E78.00 PURE HYPERCHOLESTEROLEMIA: ICD-10-CM

## 2024-05-08 DIAGNOSIS — E11.9 TYPE 2 DIABETES MELLITUS WITHOUT COMPLICATION, WITHOUT LONG-TERM CURRENT USE OF INSULIN (H): Primary | ICD-10-CM

## 2024-05-08 LAB
ALBUMIN UR-MCNC: NEGATIVE MG/DL
APPEARANCE UR: CLEAR
BASOPHILS # BLD AUTO: 0.1 10E3/UL (ref 0–0.2)
BASOPHILS NFR BLD AUTO: 1 %
BILIRUB UR QL STRIP: NEGATIVE
COLOR UR AUTO: YELLOW
EOSINOPHIL # BLD AUTO: 0.2 10E3/UL (ref 0–0.7)
EOSINOPHIL NFR BLD AUTO: 2 %
ERYTHROCYTE [DISTWIDTH] IN BLOOD BY AUTOMATED COUNT: 13.2 % (ref 10–15)
GLUCOSE UR STRIP-MCNC: 500 MG/DL
HBA1C MFR BLD: 8.6 % (ref 0–5.6)
HCT VFR BLD AUTO: 49.6 % (ref 40–53)
HGB BLD-MCNC: 17 G/DL (ref 13.3–17.7)
HGB UR QL STRIP: NEGATIVE
IMM GRANULOCYTES # BLD: 0 10E3/UL
IMM GRANULOCYTES NFR BLD: 0 %
KETONES UR STRIP-MCNC: NEGATIVE MG/DL
LEUKOCYTE ESTERASE UR QL STRIP: NEGATIVE
LYMPHOCYTES # BLD AUTO: 2.9 10E3/UL (ref 0.8–5.3)
LYMPHOCYTES NFR BLD AUTO: 44 %
MCH RBC QN AUTO: 30.1 PG (ref 26.5–33)
MCHC RBC AUTO-ENTMCNC: 34.3 G/DL (ref 31.5–36.5)
MCV RBC AUTO: 88 FL (ref 78–100)
MONOCYTES # BLD AUTO: 0.5 10E3/UL (ref 0–1.3)
MONOCYTES NFR BLD AUTO: 8 %
NEUTROPHILS # BLD AUTO: 3 10E3/UL (ref 1.6–8.3)
NEUTROPHILS NFR BLD AUTO: 45 %
NITRATE UR QL: NEGATIVE
PH UR STRIP: 5.5 [PH] (ref 5–7)
PLATELET # BLD AUTO: 296 10E3/UL (ref 150–450)
RBC # BLD AUTO: 5.64 10E6/UL (ref 4.4–5.9)
SP GR UR STRIP: 1.01 (ref 1–1.03)
UROBILINOGEN UR STRIP-ACNC: 0.2 E.U./DL
WBC # BLD AUTO: 6.7 10E3/UL (ref 4–11)

## 2024-05-08 PROCEDURE — 80053 COMPREHEN METABOLIC PANEL: CPT | Performed by: FAMILY MEDICINE

## 2024-05-08 PROCEDURE — 82043 UR ALBUMIN QUANTITATIVE: CPT | Performed by: FAMILY MEDICINE

## 2024-05-08 PROCEDURE — 83036 HEMOGLOBIN GLYCOSYLATED A1C: CPT | Performed by: FAMILY MEDICINE

## 2024-05-08 PROCEDURE — 86708 HEPATITIS A ANTIBODY: CPT | Performed by: FAMILY MEDICINE

## 2024-05-08 PROCEDURE — 83695 ASSAY OF LIPOPROTEIN(A): CPT | Performed by: FAMILY MEDICINE

## 2024-05-08 PROCEDURE — 36415 COLL VENOUS BLD VENIPUNCTURE: CPT | Performed by: FAMILY MEDICINE

## 2024-05-08 PROCEDURE — 85025 COMPLETE CBC W/AUTO DIFF WBC: CPT | Performed by: FAMILY MEDICINE

## 2024-05-08 PROCEDURE — 80061 LIPID PANEL: CPT | Performed by: FAMILY MEDICINE

## 2024-05-08 PROCEDURE — 82570 ASSAY OF URINE CREATININE: CPT | Performed by: FAMILY MEDICINE

## 2024-05-08 PROCEDURE — 82977 ASSAY OF GGT: CPT | Performed by: FAMILY MEDICINE

## 2024-05-08 PROCEDURE — 81003 URINALYSIS AUTO W/O SCOPE: CPT | Performed by: FAMILY MEDICINE

## 2024-05-08 PROCEDURE — 99214 OFFICE O/P EST MOD 30 MIN: CPT | Performed by: FAMILY MEDICINE

## 2024-05-08 NOTE — PROGRESS NOTES
Assessment & Plan     Type 2 diabetes mellitus without complication, without long-term current use of insulin (H)  A1-c is not improved. We need to change the gameplan. Trulicity and Jardiance do not seem to be helping, so I want him to stop those and try Ozempic instead. He agrees, but reminds me that insurance would not cover Ozempic previously. I said since he tried Trulicity and Jardiance and did not get those to work/help enough, now the Ozempic should be covered. I strongly encouraged him to keep up his good lifetsyle. Also, he can increase the glipizide to 10mg  - CBC with platelets and differential  - Comprehensive metabolic panel (BMP + Alb, Alk Phos, ALT, AST, Total. Bili, TP)  - Hepatitis A Antibody Total  - Hemoglobin A1c  - UA Macroscopic with reflex to Microscopic and Culture - Lab Collect  - Albumin Random Urine Quantitative with Creat Ratio  - Lipid panel reflex to direct LDL Non-fasting  - Lipoprotein (a)    Essential (primary) hypertension  Controlled. Continue same  - CBC with platelets and differential  - Comprehensive metabolic panel (BMP + Alb, Alk Phos, ALT, AST, Total. Bili, TP)  - UA Macroscopic with reflex to Microscopic and Culture - Lab Collect    Pure hypercholesterolemia  Lipoprotein a is low; LDL is well-controlled  - Lipid panel reflex to direct LDL Non-fasting  - Lipoprotein (a)    Calculus of gallbladder without cholecystitis without obstruction  GGT is normal. The stone is not causing a problem  - GGT      Review of external notes as documented elsewhere in note  Ordering of each unique test  Prescription drug management  3- minutes spent by me on the date of the encounter doing chart review, history and exam, documentation and further activities per the note                  Subjective   Pb Langston is a 59 year old year old, presenting for the following health issues:  Diabetes        History of Present Illness       Reason for visit:  DM      DM - walking daily 2-3 miles,  taking meds, trying to be good with food. He hopes his A1-C is improved.    Thinks he's had hep A vaccine in the distant past.    Doing well overall. No new problems.    Relationships are good    Review of Systems       Objective    There were no vitals taken for this visit.  There is no height or weight on file to calculate BMI.  Physical Exam   GENERAL: alert and no distress  CV: regular rate and rhythm, normal S1 S2, no S3 or S4, no murmur, click or rub, no peripheral edema  SKIN: no suspicious lesions or rashes  PSYCH: mentation appears normal, affect normal/bright  Diabetic foot exam: normal DP and PT pulses, no trophic changes or ulcerative lesions, and normal sensory exam    Results for orders placed or performed in visit on 05/08/24   Comprehensive metabolic panel (BMP + Alb, Alk Phos, ALT, AST, Total. Bili, TP)     Status: Abnormal   Result Value Ref Range    Sodium 139 135 - 145 mmol/L    Potassium 4.7 3.4 - 5.3 mmol/L    Carbon Dioxide (CO2) 23 22 - 29 mmol/L    Anion Gap 14 7 - 15 mmol/L    Urea Nitrogen 24.8 (H) 8.0 - 23.0 mg/dL    Creatinine 1.21 (H) 0.67 - 1.17 mg/dL    GFR Estimate 68 >60 mL/min/1.73m2    Calcium 10.8 (H) 8.8 - 10.2 mg/dL    Chloride 102 98 - 107 mmol/L    Glucose 201 (H) 70 - 99 mg/dL    Alkaline Phosphatase 44 40 - 150 U/L    AST 27 0 - 45 U/L    ALT 26 0 - 70 U/L    Protein Total 8.9 (H) 6.4 - 8.3 g/dL    Albumin 4.7 3.5 - 5.2 g/dL    Bilirubin Total 0.6 <=1.2 mg/dL    Patient Fasting > 8hrs? No    Hepatitis A Antibody Total     Status: None   Result Value Ref Range    Hepatitis A Antibody Total Reactive     Narrative    HAV antibody testing interpretation chart:      HAV Total Antibody - NONREACTIVE  HAV IgM - NOT TESTED  Comments: No evidence of vaccination or previous infection; Susceptible to Hepatitis A infection     HAV Total Antibody - REACTIVE   HAV IgM - NOT TESTED  Comments:Consistent with recent or remote Hepatitis A infection or antibody response to HAV  vaccination    HAV Total Antibody - REACTIVE  HAV IgM - NONREACTIVE  Comments:Consistent with resolved Hepatitis A infection or antibody response to HAV vaccination    HAV Total Antibody - REACTIVE  HAV IgM - REACTIVE  Comments:Consistent with active Hepatitis A infection   Hemoglobin A1c     Status: Abnormal   Result Value Ref Range    Hemoglobin A1C 8.6 (H) 0.0 - 5.6 %   UA Macroscopic with reflex to Microscopic and Culture - Lab Collect     Status: Abnormal    Specimen: Urine, NOS   Result Value Ref Range    Color Urine Yellow Colorless, Straw, Light Yellow, Yellow    Appearance Urine Clear Clear    Glucose Urine 500 (A) Negative mg/dL    Bilirubin Urine Negative Negative    Ketones Urine Negative Negative mg/dL    Specific Gravity Urine 1.015 1.005 - 1.030    Blood Urine Negative Negative    pH Urine 5.5 5.0 - 7.0    Protein Albumin Urine Negative Negative mg/dL    Urobilinogen Urine 0.2 0.2, 1.0 E.U./dL    Nitrite Urine Negative Negative    Leukocyte Esterase Urine Negative Negative    Narrative    Microscopic not indicated   Albumin Random Urine Quantitative with Creat Ratio     Status: None   Result Value Ref Range    Creatinine Urine mg/dL 43.8 mg/dL    Albumin Urine mg/L <12.0 mg/L    Albumin Urine mg/g Cr     Lipid panel reflex to direct LDL Non-fasting     Status: Abnormal   Result Value Ref Range    Cholesterol 174 <200 mg/dL    Triglycerides 305 (H) <150 mg/dL    Direct Measure HDL 44 >=40 mg/dL    LDL Cholesterol Calculated 69 <=100 mg/dL    Non HDL Cholesterol 130 (H) <130 mg/dL    Patient Fasting > 8hrs? No     Narrative    Cholesterol  Desirable:  <200 mg/dL    Triglycerides  Normal:  Less than 150 mg/dL  Borderline High:  150-199 mg/dL  High:  200-499 mg/dL  Very High:  Greater than or equal to 500 mg/dL    Direct Measure HDL  Female:  Greater than or equal to 50 mg/dL   Male:  Greater than or equal to 40 mg/dL    LDL Cholesterol  Desirable:  <100mg/dL  Above Desirable:  100-129 mg/dL   Borderline  High:  130-159 mg/dL   High:  160-189 mg/dL   Very High:  >= 190 mg/dL    Non HDL Cholesterol  Desirable:  130 mg/dL  Above Desirable:  130-159 mg/dL  Borderline High:  160-189 mg/dL  High:  190-219 mg/dL  Very High:  Greater than or equal to 220 mg/dL   Lipoprotein (a)     Status: Normal   Result Value Ref Range    Lipoprotein (a) <6 <30 mg/dL   GGT     Status: Normal   Result Value Ref Range    GGT 26 8 - 61 U/L   CBC with platelets and differential     Status: None   Result Value Ref Range    WBC Count 6.7 4.0 - 11.0 10e3/uL    RBC Count 5.64 4.40 - 5.90 10e6/uL    Hemoglobin 17.0 13.3 - 17.7 g/dL    Hematocrit 49.6 40.0 - 53.0 %    MCV 88 78 - 100 fL    MCH 30.1 26.5 - 33.0 pg    MCHC 34.3 31.5 - 36.5 g/dL    RDW 13.2 10.0 - 15.0 %    Platelet Count 296 150 - 450 10e3/uL    % Neutrophils 45 %    % Lymphocytes 44 %    % Monocytes 8 %    % Eosinophils 2 %    % Basophils 1 %    % Immature Granulocytes 0 %    Absolute Neutrophils 3.0 1.6 - 8.3 10e3/uL    Absolute Lymphocytes 2.9 0.8 - 5.3 10e3/uL    Absolute Monocytes 0.5 0.0 - 1.3 10e3/uL    Absolute Eosinophils 0.2 0.0 - 0.7 10e3/uL    Absolute Basophils 0.1 0.0 - 0.2 10e3/uL    Absolute Immature Granulocytes 0.0 <=0.4 10e3/uL   CBC with platelets and differential     Status: None    Narrative    The following orders were created for panel order CBC with platelets and differential.  Procedure                               Abnormality         Status                     ---------                               -----------         ------                     CBC with platelets and d...[[id number]]                      Final result                 Please view results for these tests on the individual orders.

## 2024-05-09 LAB
ALBUMIN SERPL BCG-MCNC: 4.7 G/DL (ref 3.5–5.2)
ALP SERPL-CCNC: 44 U/L (ref 40–150)
ALT SERPL W P-5'-P-CCNC: 26 U/L (ref 0–70)
ANION GAP SERPL CALCULATED.3IONS-SCNC: 14 MMOL/L (ref 7–15)
APO A-I SERPL-MCNC: <6 MG/DL
AST SERPL W P-5'-P-CCNC: 27 U/L (ref 0–45)
BILIRUB SERPL-MCNC: 0.6 MG/DL
BUN SERPL-MCNC: 24.8 MG/DL (ref 8–23)
CALCIUM SERPL-MCNC: 10.8 MG/DL (ref 8.8–10.2)
CHLORIDE SERPL-SCNC: 102 MMOL/L (ref 98–107)
CHOLEST SERPL-MCNC: 174 MG/DL
CREAT SERPL-MCNC: 1.21 MG/DL (ref 0.67–1.17)
CREAT UR-MCNC: 43.8 MG/DL
DEPRECATED HCO3 PLAS-SCNC: 23 MMOL/L (ref 22–29)
EGFRCR SERPLBLD CKD-EPI 2021: 68 ML/MIN/1.73M2
FASTING STATUS PATIENT QL REPORTED: NO
FASTING STATUS PATIENT QL REPORTED: NO
GGT SERPL-CCNC: 26 U/L (ref 8–61)
GLUCOSE SERPL-MCNC: 201 MG/DL (ref 70–99)
HAV AB SER QL IA: REACTIVE
HDLC SERPL-MCNC: 44 MG/DL
LDLC SERPL CALC-MCNC: 69 MG/DL
MICROALBUMIN UR-MCNC: <12 MG/L
MICROALBUMIN/CREAT UR: NORMAL MG/G{CREAT}
NONHDLC SERPL-MCNC: 130 MG/DL
POTASSIUM SERPL-SCNC: 4.7 MMOL/L (ref 3.4–5.3)
PROT SERPL-MCNC: 8.9 G/DL (ref 6.4–8.3)
SODIUM SERPL-SCNC: 139 MMOL/L (ref 135–145)
TRIGL SERPL-MCNC: 305 MG/DL

## 2024-05-24 ENCOUNTER — TRANSFERRED RECORDS (OUTPATIENT)
Dept: MULTI SPECIALTY CLINIC | Facility: CLINIC | Age: 59
End: 2024-05-24

## 2024-05-24 LAB — RETINOPATHY: NORMAL

## 2024-06-05 ENCOUNTER — TRANSFERRED RECORDS (OUTPATIENT)
Dept: HEALTH INFORMATION MANAGEMENT | Facility: CLINIC | Age: 59
End: 2024-06-05
Payer: COMMERCIAL

## 2024-06-25 DIAGNOSIS — E11.9 TYPE 2 DIABETES MELLITUS WITHOUT COMPLICATION, WITHOUT LONG-TERM CURRENT USE OF INSULIN (H): ICD-10-CM

## 2024-06-25 RX ORDER — EMPAGLIFLOZIN 25 MG/1
TABLET, FILM COATED ORAL
Qty: 90 TABLET | Refills: 0 | Status: SHIPPED | OUTPATIENT
Start: 2024-06-25 | End: 2024-08-13

## 2024-07-03 DIAGNOSIS — E11.9 TYPE 2 DIABETES MELLITUS WITHOUT COMPLICATION, WITHOUT LONG-TERM CURRENT USE OF INSULIN (H): ICD-10-CM

## 2024-07-14 ENCOUNTER — HEALTH MAINTENANCE LETTER (OUTPATIENT)
Age: 59
End: 2024-07-14

## 2024-07-15 ENCOUNTER — OFFICE VISIT (OUTPATIENT)
Dept: FAMILY MEDICINE | Facility: CLINIC | Age: 59
End: 2024-07-15
Payer: COMMERCIAL

## 2024-07-15 VITALS
BODY MASS INDEX: 24.78 KG/M2 | RESPIRATION RATE: 18 BRPM | WEIGHT: 171.2 LBS | SYSTOLIC BLOOD PRESSURE: 181 MMHG | OXYGEN SATURATION: 97 % | HEART RATE: 65 BPM | TEMPERATURE: 97.8 F | DIASTOLIC BLOOD PRESSURE: 107 MMHG

## 2024-07-15 DIAGNOSIS — A69.20 LYME DISEASE: Primary | ICD-10-CM

## 2024-07-15 PROCEDURE — 99213 OFFICE O/P EST LOW 20 MIN: CPT

## 2024-07-15 RX ORDER — DOXYCYCLINE 100 MG/1
100 CAPSULE ORAL 2 TIMES DAILY
Qty: 14 CAPSULE | Refills: 0 | Status: SHIPPED | OUTPATIENT
Start: 2024-07-15 | End: 2024-07-22

## 2024-07-15 NOTE — PROGRESS NOTES
Assessment & Plan     Lyme disease  59-year-old male bit by deer tick and had engorged for greater than 36 hours.  Having generalized symptoms concerning for Lyme disease therefore we will treat with a full course of doxycycline.  Discussed risks and benefits.  - doxycycline hyclate (VIBRAMYCIN) 100 MG capsule; Take 1 capsule (100 mg) by mouth 2 times daily for 7 days    Return if symptoms worsen or fail to improve.    Subjective   Pb is a 59 year old, presenting for the following health issues:  Insect Bites (1 week tick bit around belly.)    HPI   Patient presenting today for tick concern.  Reports he was out doing yard work last week and had 2 ticks that were found on him.  He removed them and was not concerned.  Then 6 days later he found a tick that was engorged and identified as a deer tick in his bellybutton.  He has since reported some fatigue, arthralgias but no new rash.  Denies any systemic symptoms of fevers or chills.      Review of Systems  Constitutional, HEENT, cardiovascular, pulmonary, gi and gu systems are negative, except as otherwise noted.      Objective    BP (!) 181/107   Pulse 65   Temp 97.8  F (36.6  C) (Oral)   Resp 18   Wt 77.7 kg (171 lb 3.2 oz)   SpO2 97%   BMI 24.78 kg/m    Body mass index is 24.78 kg/m .  Physical Exam   GENERAL: alert and no distress  NECK: no adenopathy, no asymmetry, masses, or scars  RESP: lungs clear to auscultation - no rales, rhonchi or wheezes  CV: regular rate and rhythm, normal S1 S2, no S3 or S4, no murmur, click or rub, no peripheral edema  ABDOMEN: soft, nontender, no hepatosplenomegaly, no masses and bowel sounds normal  MS: no gross musculoskeletal defects noted, no edema        Signed Electronically by: José Luis Kilgore DO

## 2024-07-29 ENCOUNTER — TELEPHONE (OUTPATIENT)
Dept: FAMILY MEDICINE | Facility: CLINIC | Age: 59
End: 2024-07-29
Payer: COMMERCIAL

## 2024-07-29 NOTE — TELEPHONE ENCOUNTER
Patient Quality Outreach    Patient is due for the following:   Diabetes -  Eye Exam  Physical Preventive Adult Physical      Topic Date Due    Meningitis A Vaccine (1 - Risk 2-dose series) Never done    Hepatitis A Vaccine (1 of 2 - Risk 2-dose series) Never done    COVID-19 Vaccine (6 - 2023-24 season) 09/01/2023       Next Steps:   Patient has upcoming appointment, these items will be addressed at that time.    Type of outreach:    Chart review performed, no outreach needed.      Questions for provider review:    None           Pascual Rhodes MA  Chart routed to Care Team.

## 2024-08-13 DIAGNOSIS — N18.32 STAGE 3B CHRONIC KIDNEY DISEASE (H): ICD-10-CM

## 2024-08-13 DIAGNOSIS — I10 ESSENTIAL (PRIMARY) HYPERTENSION: ICD-10-CM

## 2024-08-13 DIAGNOSIS — E11.9 TYPE 2 DIABETES MELLITUS WITHOUT COMPLICATION, WITHOUT LONG-TERM CURRENT USE OF INSULIN (H): Primary | ICD-10-CM

## 2024-08-14 ENCOUNTER — OFFICE VISIT (OUTPATIENT)
Dept: FAMILY MEDICINE | Facility: CLINIC | Age: 59
End: 2024-08-14
Payer: COMMERCIAL

## 2024-08-14 VITALS
DIASTOLIC BLOOD PRESSURE: 86 MMHG | SYSTOLIC BLOOD PRESSURE: 150 MMHG | RESPIRATION RATE: 18 BRPM | HEART RATE: 58 BPM | OXYGEN SATURATION: 97 % | WEIGHT: 169.25 LBS | BODY MASS INDEX: 24.23 KG/M2 | TEMPERATURE: 97.6 F | HEIGHT: 70 IN

## 2024-08-14 DIAGNOSIS — Z21 HIV INFECTION, UNSPECIFIED SYMPTOM STATUS (H): ICD-10-CM

## 2024-08-14 DIAGNOSIS — E11.9 TYPE 2 DIABETES MELLITUS WITHOUT COMPLICATION, WITHOUT LONG-TERM CURRENT USE OF INSULIN (H): Primary | ICD-10-CM

## 2024-08-14 DIAGNOSIS — N18.32 STAGE 3B CHRONIC KIDNEY DISEASE (H): ICD-10-CM

## 2024-08-14 DIAGNOSIS — Z00.00 PREVENTATIVE HEALTH CARE: ICD-10-CM

## 2024-08-14 DIAGNOSIS — I10 ESSENTIAL (PRIMARY) HYPERTENSION: ICD-10-CM

## 2024-08-14 LAB
ANION GAP SERPL CALCULATED.3IONS-SCNC: 11 MMOL/L (ref 7–15)
BUN SERPL-MCNC: 25.3 MG/DL (ref 8–23)
CALCIUM SERPL-MCNC: 9.4 MG/DL (ref 8.8–10.4)
CHLORIDE SERPL-SCNC: 101 MMOL/L (ref 98–107)
CREAT SERPL-MCNC: 0.99 MG/DL (ref 0.67–1.17)
EGFRCR SERPLBLD CKD-EPI 2021: 87 ML/MIN/1.73M2
GLUCOSE SERPL-MCNC: 309 MG/DL (ref 70–99)
HBA1C MFR BLD: 11.2 % (ref 0–5.6)
HCO3 SERPL-SCNC: 24 MMOL/L (ref 22–29)
POTASSIUM SERPL-SCNC: 4.4 MMOL/L (ref 3.4–5.3)
SODIUM SERPL-SCNC: 136 MMOL/L (ref 135–145)

## 2024-08-14 PROCEDURE — 83036 HEMOGLOBIN GLYCOSYLATED A1C: CPT | Performed by: FAMILY MEDICINE

## 2024-08-14 PROCEDURE — 36415 COLL VENOUS BLD VENIPUNCTURE: CPT | Performed by: FAMILY MEDICINE

## 2024-08-14 PROCEDURE — 99215 OFFICE O/P EST HI 40 MIN: CPT | Performed by: FAMILY MEDICINE

## 2024-08-14 PROCEDURE — 80048 BASIC METABOLIC PNL TOTAL CA: CPT | Performed by: FAMILY MEDICINE

## 2024-08-14 NOTE — PROGRESS NOTES
Assessment & Plan     Type 2 diabetes mellitus without complication, without long-term current use of insulin (H)  A1-C up to 11.2! He needs better control. Will increase semaglutide to 1.0 weekly, plus resume walking with their dogs.  - Hemoglobin A1c  - Basic metabolic panel  (Ca, Cl, CO2, Creat, Gluc, K, Na, BUN)  - Semaglutide, 1 MG/DOSE, (OZEMPIC) 4 MG/3ML pen  Dispense: 3 mL; Refill: 11    Essential (primary) hypertension  BP is slightly high today. He wants to track it at home and not change/increase meds right now. HE will try to improve lifestyle to help it. He agrees to be in touch with home Bps (if needed, will increase the dose of lisinopril he takes)  - Basic metabolic panel  (Ca, Cl, CO2, Creat, Gluc, K, Na, BUN)    Stage 3b chronic kidney disease (H)  Improved! GFR now 87, last time was 68  - Basic metabolic panel  (Ca, Cl, CO2, Creat, Gluc, K, Na, BUN)    Preventative health care  reviewed  - REVIEW OF HEALTH MAINTENANCE PROTOCOL ORDERS    HIV infection, unspecified symptom status (H)  Managed by infectious disease    On the day of service, I spent 40 minutes with the patient, preparing for the visit with chart review, and charting.              Emile Ambriz is a 59 year old, presenting for the following health issues:  Diabetes      8/14/2024     9:18 AM   Additional Questions   Roomed by Roslyn MADDEN   Accompanied by self     Via the Health Maintenance questionnaire, the patient has reported the following services have been completed -Eye Exam: [facility] 2024-05-24, this information has been sent to the abstraction team.  History of Present Illness       Diabetes:   He presents for follow up of diabetes.  He is checking home blood glucose a few times a week.   He checks blood glucose before meals.  Blood glucose is sometimes over 200 and never under 70. He is aware of hypoglycemia symptoms including shakiness.   He is concerned about blood sugar frequently over 200.   He is having numbness  "in feet.  The patient has had a diabetic eye exam in the last 12 months. Eye exam performed on 05/24/24. Location of last eye exam [facility] in [place].        He eats 2-3 servings of fruits and vegetables daily.He consumes 0 sweetened beverage(s) daily.He exercises with enough effort to increase his heart rate 10 to 19 minutes per day.  He exercises with enough effort to increase his heart rate 3 or less days per week.   He is taking medications regularly.     Thinks A1-C will be around 10  Exercise has decreased, intake is about the same.  Not walking the dogs.    Sleeping - ok, not enough.  Uses CPAP  Occasional naps on weekends.    Got RSV and covid last fall on same date    Had eye exam        Objective    Ht 1.78 m (5' 10.08\")   Wt 76.8 kg (169 lb 4 oz)   BMI 24.23 kg/m    Body mass index is 24.23 kg/m .  Physical Exam               Signed Electronically by: Nuha Paredes MD    "

## 2024-08-15 ENCOUNTER — MYC MEDICAL ADVICE (OUTPATIENT)
Dept: FAMILY MEDICINE | Facility: CLINIC | Age: 59
End: 2024-08-15
Payer: COMMERCIAL

## 2024-08-16 ENCOUNTER — TELEPHONE (OUTPATIENT)
Dept: FAMILY MEDICINE | Facility: CLINIC | Age: 59
End: 2024-08-16
Payer: COMMERCIAL

## 2024-08-16 NOTE — TELEPHONE ENCOUNTER
PA started for patient. See TE from today.   
Writer responded to patient message via [employer].     Saul Eid, MSN, RN   [facility]     
immune

## 2024-08-16 NOTE — TELEPHONE ENCOUNTER
Prior Authorization Retail Medication Request    Medication/Dose: Semaglutide, 1 MG/DOSE, (OZEMPIC) 4 MG/3ML pen,  Sig - Route: Inject 1 mg subcutaneously every 7 days - Subcutaneous     Diagnosis and ICD code (if different than what is on RX):  Type 2 diabetes mellitus without complication, without long-term current use of insulin (H) [E11.9]  - Primary     New/renewal/insurance change PA/secondary ins. PA: N/A    Previously Tried and Failed:  N/A    Rationale:  N/A    Insurance   Primary: [employer]   Insurance ID:  [id number]    Secondary (if applicable): N/A  Insurance ID:  N/A    Pharmacy Information (if different than what is on RX)  Name:    [id number]  [address] (at [facility] [address])  [place], [place]     Phone:  [phone]  Fax:[phone]        Saul Eid, MSN, RN   [facility]

## 2024-08-19 ENCOUNTER — MYC MEDICAL ADVICE (OUTPATIENT)
Dept: FAMILY MEDICINE | Facility: CLINIC | Age: 59
End: 2024-08-19
Payer: COMMERCIAL

## 2024-08-19 ENCOUNTER — NURSE TRIAGE (OUTPATIENT)
Dept: FAMILY MEDICINE | Facility: CLINIC | Age: 59
End: 2024-08-19
Payer: COMMERCIAL

## 2024-08-19 DIAGNOSIS — M79.672 LEFT FOOT PAIN: Primary | ICD-10-CM

## 2024-08-19 NOTE — TELEPHONE ENCOUNTER
Dr. Paredes-Please review and advise if you recommend foot x-ray and/or separate office visit for evaluation?    Call received from patient:  Follow up to 8/14/24  Left foot pain continues  Moderate pain and worsens with applying pressure   Constant pain  A little red spot where it hurts the most  No bruising nor swelling   Able to ambulate  Pain at top of foot; does not radiate  No fever  Discussed foot pain with Dr. Paredes at 8/14/24 appt and is wondering if he needs x-ray? (Writer does not find foot pain noted in 8/14/24 office visit)    Informed patient message will be sent to Dr. Paredes, who is not in clinic today but due to be in clinic tomorrow.    Patient verbalized understanding and in agreement with plan.    Thank you!  YANETH LovelaceN, RN-[facility]    Reason for Disposition   MODERATE pain (e.g., interferes with normal activities, limping) and present > 3 days    Additional Information   Negative: Followed an ankle or foot injury   Negative: Toe pain is main symptom   Negative: Ankle pain is main symptom   Negative: Entire foot is cool or blue in comparison to other foot   Negative: Purple or black skin on foot or toe   Negative: Red area or streak and fever   Negative: Swollen foot and fever   Negative: Patient sounds very sick or weak to the triager   Negative: SEVERE pain (e.g., excruciating, unable to do any normal activities)   Negative: Looks like a boil, infected sore, deep ulcer, or other infected rash (spreading redness, pus)   Negative: Swollen foot  (Exceptions: Localized bump from bunions, calluses, insect bite, sting.)   Negative: Weakness (i.e., loss of strength) of new-onset in foot or toes (Exceptions: Not truly weak, foot feels weak because of pain; weakness present > 2 weeks.)   Negative: Numbness (i.e., loss of sensation) in foot or toes (Exception: Just tingling; numbness present > 2 weeks.)    Protocols used: Foot Pain-A-[place]

## 2024-08-19 NOTE — TELEPHONE ENCOUNTER
Central Prior Authorization Team   Phone: [phone]    PA Initiation    Medication: Ozempic   Insurance Company: [employer] - Phone [phone] Fax [phone]  Pharmacy Filling the Rx: [id number] [place] [address] AT [facility] [address]  Filling Pharmacy Phone: [phone]  Filling Pharmacy Fax:    Start Date: 8/19/2024       Sickle cell crisis Sickle cell crisis pain

## 2024-08-19 NOTE — TELEPHONE ENCOUNTER
Called pt in an attempt to triage foot pain. Left message for pt to call clinic back and request to speak to a triage nurse.        Yao Pearson, BSN RN  [facility]

## 2024-08-20 NOTE — TELEPHONE ENCOUNTER
Yes, I think he should get an xray of his left foot. I put in the order - he can schedule an xray-only visit at [facility] or he can go elsewhere within [facility].

## 2024-08-20 NOTE — TELEPHONE ENCOUNTER
Writer replied to patient via [employer] message.    YANETH HuN, RN   [facility]

## 2024-08-20 NOTE — TELEPHONE ENCOUNTER
Writer replied to patient via [employer] message.    Patient already has X-ray appointment scheduled for:    Aug 21, 2024 7:50 AM  (Arrive by 7:35 AM)  Xray General with SPRO XR 1  [facility] ([facility] ) [phone]     YANETH HuN, RN   [facility]

## 2024-08-21 ENCOUNTER — ANCILLARY PROCEDURE (OUTPATIENT)
Dept: GENERAL RADIOLOGY | Facility: CLINIC | Age: 59
End: 2024-08-21
Attending: FAMILY MEDICINE
Payer: COMMERCIAL

## 2024-08-21 ENCOUNTER — MYC MEDICAL ADVICE (OUTPATIENT)
Dept: FAMILY MEDICINE | Facility: CLINIC | Age: 59
End: 2024-08-21

## 2024-08-21 DIAGNOSIS — M79.672 LEFT FOOT PAIN: ICD-10-CM

## 2024-08-21 DIAGNOSIS — E11.9 TYPE 2 DIABETES MELLITUS WITHOUT COMPLICATION, WITHOUT LONG-TERM CURRENT USE OF INSULIN (H): Primary | ICD-10-CM

## 2024-08-21 PROCEDURE — 73630 X-RAY EXAM OF FOOT: CPT | Mod: TC | Performed by: RADIOLOGY

## 2024-08-21 RX ORDER — BLOOD SUGAR DIAGNOSTIC
STRIP MISCELLANEOUS
Qty: 100 STRIP | Refills: 11 | Status: SHIPPED | OUTPATIENT
Start: 2024-08-21

## 2024-08-21 NOTE — TELEPHONE ENCOUNTER
Writer pended refill request for Verio One Touch Test strips, and preferred pharmacy.    Routing message to Dr. Paredes to review and approve.    Navin Butler, BSN, RN   [facility]

## 2024-08-23 NOTE — TELEPHONE ENCOUNTER
Prior Authorization Approval    Authorization Effective Date: 8/19/2024  Authorization Expiration Date: 8/19/2025  Medication: Ozempic   Reference #:     Insurance Company: [employer] - Phone [phone] Fax [phone]  Which Pharmacy is filling the prescription (Not needed for infusion/clinic administered): [id number] [address] AT [facility] [address]  Pharmacy Notified: Yes  Patient Notified: Instructed pharmacy to notify patient when script is ready to /ship.

## 2024-10-01 ENCOUNTER — MYC MEDICAL ADVICE (OUTPATIENT)
Dept: FAMILY MEDICINE | Facility: CLINIC | Age: 59
End: 2024-10-01
Payer: COMMERCIAL

## 2024-10-01 NOTE — TELEPHONE ENCOUNTER
Will route encounter to scheduling pool to call patient to schedule an appt.    Modesto Mario RN  [facility]

## 2024-10-03 ENCOUNTER — OFFICE VISIT (OUTPATIENT)
Dept: PHARMACY | Facility: CLINIC | Age: 59
End: 2024-10-03
Payer: COMMERCIAL

## 2024-10-03 VITALS — OXYGEN SATURATION: 97 % | DIASTOLIC BLOOD PRESSURE: 74 MMHG | HEART RATE: 70 BPM | SYSTOLIC BLOOD PRESSURE: 140 MMHG

## 2024-10-03 DIAGNOSIS — E78.5 HYPERLIPIDEMIA, UNSPECIFIED HYPERLIPIDEMIA TYPE: ICD-10-CM

## 2024-10-03 DIAGNOSIS — E11.9 TYPE 2 DIABETES MELLITUS WITHOUT COMPLICATION, WITHOUT LONG-TERM CURRENT USE OF INSULIN (H): Primary | ICD-10-CM

## 2024-10-03 DIAGNOSIS — I10 ESSENTIAL (PRIMARY) HYPERTENSION: ICD-10-CM

## 2024-10-03 DIAGNOSIS — Z21 HIV INFECTION, UNSPECIFIED SYMPTOM STATUS (H): ICD-10-CM

## 2024-10-03 DIAGNOSIS — K21.9 GASTROESOPHAGEAL REFLUX DISEASE WITHOUT ESOPHAGITIS: ICD-10-CM

## 2024-10-03 PROCEDURE — 99606 MTMS BY PHARM EST 15 MIN: CPT | Performed by: PHARMACIST

## 2024-10-03 PROCEDURE — 99607 MTMS BY PHARM ADDL 15 MIN: CPT | Performed by: PHARMACIST

## 2024-10-03 RX ORDER — INSULIN GLARGINE-YFGN 100 [IU]/ML
10 INJECTION, SOLUTION SUBCUTANEOUS DAILY
Qty: 15 ML | Refills: 3 | Status: SHIPPED | OUTPATIENT
Start: 2024-10-03 | End: 2024-11-05

## 2024-10-03 RX ORDER — KETOROLAC TROMETHAMINE 30 MG/ML
1 INJECTION, SOLUTION INTRAMUSCULAR; INTRAVENOUS ONCE
Qty: 1 EACH | Refills: 0 | Status: SHIPPED | OUTPATIENT
Start: 2024-10-03 | End: 2024-10-03

## 2024-10-03 RX ORDER — LISINOPRIL 20 MG/1
20 TABLET ORAL DAILY
COMMUNITY
Start: 2024-09-16

## 2024-10-03 RX ORDER — PEN NEEDLE, DIABETIC 32GX 5/32"
NEEDLE, DISPOSABLE MISCELLANEOUS
Qty: 100 EACH | Refills: 3 | Status: SHIPPED | OUTPATIENT
Start: 2024-10-03

## 2024-10-03 RX ORDER — HYDROCHLOROTHIAZIDE 12.5 MG/1
CAPSULE ORAL
Qty: 6 EACH | Refills: 3 | Status: SHIPPED | OUTPATIENT
Start: 2024-10-03

## 2024-10-03 NOTE — PATIENT INSTRUCTIONS
"Recommendations from today's MTM visit:                                                    MTM (medication therapy management) is a service provided by a clinical pharmacist designed to help you get the most of out of your medicines.   Today we reviewed what your medicines are for, how to know if they are working, that your medicines are safe and how to make your medicine regimen as easy as possible.      Lantus 10 units once daily   Goal fasting blood sugar  mg/dL  Increase by 2 units every 3 days   Max 16 units daily  Notify Syed Dong if having lows  Check BP every other morning    Follow-up: Return in about 1 month (around 11/5/2024) for With PharmD.    It was great speaking with you today.  I value your experience and would be very thankful for your time in providing feedback in our clinic survey. In the next few days, you may receive an email or text message from [employer] with a link to a survey related to your  clinical pharmacist.\"     To schedule another MTM appointment, please call the clinic directly or you may call the MTM scheduling line at [phone].    My Clinical Pharmacist's contact information:                                                      Please feel free to contact me with any questions or concerns you have.      Lima Dong, PharmD, BCACP  Medication Therapy Management Pharmacist    "

## 2024-10-03 NOTE — Clinical Note
Elisa, Met with patient today for first visit. Started Lantus insulin and Johanny 3 and will continue to follow with him for that.   One other thing is that his lisinopril was recently increased by ID to 20 mg. If its ok with you I can recheck his BMP when he comes in next month. If his BP is still high at that time, would you like me to add amlodipine? I would be aiming for BP goal <130/80 mmHg, unless you have different goal in mind.   Please let me know your thoughts.   Lima Dong, PharmD, BCACP Medication Therapy Management Pharmacist

## 2024-10-03 NOTE — PROGRESS NOTES
Medication Therapy Management (MTM) Encounter    ASSESSMENT:                            Medication Adherence/Access: No issues identified    1. Type 2 diabetes mellitus without complication, without long-term current use of insulin (H)  A1c elevated, not meeting goal <7%.  Patient would benefit from adding basal insulin to help with management of diabetes control, will start with Lantus 10 units daily, directed patient to increase by 2 units every 3 days if blood sugars not at goal fasting 80 to 130 mg/dL.  Patient to monitor closely for hypoglycemia and notify clinic if experiencing episodes.  Patient would benefit from increased monitoring of blood sugars, sent prescription for MemfoACTyle johanny 3 to pharmacy today.  Recommended patient download johanny 3 and johanny link yessy on his phone.    2. Essential (primary) hypertension  BP not at goal <130/80 mmHg, patient recently increased dose of lisinopril and readings are improving.  For now, recommend patient monitor at home BP readings and bring BP log to next visit.  Would recommend monitoring BMP at next visit, could consider increasing dose or additional therapy if needed.  Could consider adding calcium channel blocker.    3. Hyperlipidemia, unspecified hyperlipidemia type  Stable on moderate intensity statin and fenofibrate, continue without change.    4. HIV infection, unspecified symptom status (H)  Managed by infectious disease, updated medication list to reflect appropriate medications today.    5. Gastroesophageal reflux disease without esophagitis  Relatively stable, in the future could consider adding Tums as needed for symptoms.  If so, administer antacids at least 2 hours before or 4 hours after oral rilpivirine     PLAN:                            Start Lantus insulin 10 units daily and increase by 2 units every 3 days if fasting blood sugar not at goal  mg/dL  Start Johanny 3+ CGM for blood sugar monitoring    Medication issues to be addressed at a  future visit:   At next visit check BMP  Tums?  CCB?  Decrease glimepiride?  Resume jardiance?  Inc ozempic?    Follow-up: Return in about 1 month (around 2024) for With PharmD.    SUBJECTIVE/OBJECTIVE:                          Pb Langston is a 59 year old male seen for an initial visit. He was referred to me from Nuha Paredes.    Of note, initial visit fit into 30 min slot today due to urgent request, patient arrived late and visit started late.     Reason for visit: DM focus. I would like to pursue the idea of going on insulin long enough to get my A1C down so I can get my knee replacement.     Allergies/ADRs: Reviewed in chart  Past Medical History: Reviewed in chart  Tobacco: He reports that he has never smoked. He has never been exposed to tobacco smoke. He has never used smokeless tobacco.  Alcohol: 1-3 beverages / week    Medication Adherence/Access: Patient takes medications directly from bottles.  Patient takes medications 2 time(s) per day.   Per patient, misses medication 0 times per week. Rare to miss a dose.     Diabetes   Glimepiride 4 mg 2 tablets every morning  Ozempic 1 mg weekly on Sundays  Appertite has been down quite a bit, overall tolerating medication well.   Patient is not experiencing side effects.  Current diabetes symptoms: numbness/tingling settled down a bit lately  Diet/Exercise: 3 meals per day, Doing more activity with walking and biking. No changes to diet. Gave up soda and diet soda.   Med Hx: Trulicity(wasn't decreasing blood sugar enough), Jardiance (wasn't decreasing blood sugar enough, switched to Ozempic), metformin (diarrhea)     Blood sugar monitorin time(s) daily fasting; Ranges: (patient reported) Fasting- States that blood sugar are staying in mid-200's, sometimes higher into 300's.    Eye exam is up to date  Foot exam is up to date    Hypertension   Lisinopril 20 mg daily (dose recently increased from 5 mg per ID 8/15)  Patient reports no current  medication side effects  Patient self monitors blood pressure.  Home BP monitoring Last readings at home were <130/80 mmHg at home, but has not checked in a while because reading was improving after starting higher dose .       Hyperlipidemia   Atorvastatin 10 mg daily   Fenofibrate 145 mg daily  Patient reports no significant myalgias or other side effects.  The 10-year ASCVD risk score (Juliane TEE, et al., 2019) is: 22.3%    Values used to calculate the score:      Age: 59 years      Sex: Male      Is Non- : No      Diabetic: Yes      Tobacco smoker: No      Systolic Blood Pressure: 140 mmHg      Is BP treated: Yes      HDL Cholesterol: 44 mg/dL      Total Cholesterol: 174 mg/dL     HIV  Juluca once daily  No medication concerns noted.     GERD    Famotidine 20  mg once daily in the evening  Patient reports heartburn occasionally. Previously was taking omeprazole which helped him even more, but cannot take with Juluca.       Today's Vitals: BP (!) 140/74   Pulse 70   SpO2 97%   ----------------      I spent 40 minutes with this patient today. All changes were made via collaborative practice agreement with Nuha Paredes MD. A copy of the visit note was provided to the patient's provider(s).    A summary of these recommendations was given to the patient.    Lima Dong, PharmD, [facility]  Medication Therapy Management Pharmacist     Medication Therapy Recommendations  Type 2 diabetes mellitus without complication (H)    Current Medication: insulin pen needle (BD QUOC U/F) 32G X 4 MM miscellaneous   Rationale: Synergistic therapy - Needs additional medication therapy - Indication   Recommendation: Start Medication   Status: Accepted per CPA

## 2024-10-08 ENCOUNTER — MYC MEDICAL ADVICE (OUTPATIENT)
Dept: PHARMACY | Facility: CLINIC | Age: 59
End: 2024-10-08
Payer: COMMERCIAL

## 2024-10-29 NOTE — PROGRESS NOTES
Medication Therapy Management (MTM) Encounter    ASSESSMENT:                            Medication Adherence/Access: No issues identified.    1. Type 2 diabetes mellitus without complication, without long-term current use of insulin (H) (Primary)  A1c not at goal <7%.  Reviewed CGM data with patient today, postprandial blood sugars remain elevated.  Patient would benefit from increasing Ozempic to 2 mg weekly and resuming SGLT2 inhibitor.  Will send updated prescription for basal insulin at current dose, 24 units daily.    2. Essential (primary) hypertension  BP not at goal <130/80 mmHg, patient recently increased dose of lisinopril and readings are improving.  Would recommend rechecking BMP at next lab visit.  Could consider increasing lisinopril dose or adding calcium channel blocker in the future.    3. Hyperlipidemia, unspecified hyperlipidemia type  LDL at goal <70 mg/dL, continue moderate intensity statin and fenofibrate.    PLAN:                            Increase dose: Ozempic 2 mg weekly  Start Jardiance 10 mg daily  Continue Lantus 24 units daily    Medication issues to be addressed at a future visit:   Next lab visit: A1c & BMP  CCB?    Follow-up: Return in about 6 weeks (around 12/17/2024) for PCP.  MTM PRN    SUBJECTIVE/OBJECTIVE:                          Pb Langston is a 59 year old male seen for a follow-up visit.       Reason for visit: MTM follow up.  Patient wants to decrease his A1c to get knee replacement.    Allergies/ADRs: Reviewed in chart  Past Medical History: Reviewed in chart  Tobacco: He reports that he has never smoked. He has never been exposed to tobacco smoke. He has never used smokeless tobacco.  Alcohol: 1-3 beverages / week    Medication Adherence/Access: Patient takes medications directly from bottles.  Patient takes medications 2 time(s) per day.   Per patient, misses medication 0 times per week. Rare to miss a dose.     Diabetes   Glimepiride 4 mg 2 tablets every  morning  Ozempic 1 mg weekly on Sundays  Lantus 24 units daily in the morning  Appertite has been down quite a bit, more so when he first started Ozempic. Overall tolerating medication well.   Once got home from vacation his numbers have improved, on vacation they were very high and always in the 300s.   Patient is not experiencing side effects.  Current diabetes symptoms: numbness/tingling settled down a bit lately  Diet/Exercise: Eating 3 meals per day. On vacation was exercising more, knee bothering him now, will try to get better at this now that he is home, has been doing stationary bike. No changes to diet. Gave up soda and diet soda.   Med Hx: Trulicity(wasn't decreasing blood sugar enough), Jardiance (wasn't decreasing blood sugar enough, switched to Ozempic), metformin (diarrhea)     Blood sugar monitoring: [employer] CGM: Patient agreed to link his [employer] yessy to the clinic account during appointment today.    Eye exam is up to date  Foot exam is up to date    Hypertension   Lisinopril 20 mg daily (dose recently increased from 5 mg per ID 8/15)  Patient reports no current medication side effects  Patient self monitors blood pressure.  Home BP monitoring only occasionally, has not checked in a while .       Hyperlipidemia   Atorvastatin 10 mg daily   Fenofibrate 145 mg daily  Patient reports no significant myalgias or other side effects.  The 10-year ASCVD risk score (Juliane TEE, et al., 2019) is: 21.3%    Values used to calculate the score:      Age: 59 years      Sex: Male      Is Non- : No      Diabetic: Yes      Tobacco smoker: No      Systolic Blood Pressure: 136 mmHg      Is BP treated: Yes      HDL Cholesterol: 44 mg/dL      Total Cholesterol: 174 mg/dL     Today's Vitals: /84   Pulse 71   Wt 174 lb 6.4 oz (79.1 kg)   SpO2 94%   BMI 24.97 kg/m    ----------------      I spent 30 minutes with this patient today. All changes were made via collaborative practice agreement  with Nuha Paredes MD. A copy of the visit note was provided to the patient's provider(s).    A summary of these recommendations was sent via [employer].    Lima Dong, PharmD, [facility]  Medication Therapy Management Pharmacist     Medication Therapy Recommendations  Type 2 diabetes mellitus without complication (H)   1 Current Medication: Semaglutide, 1 MG/DOSE, (OZEMPIC) 4 MG/3ML pen (Discontinued)   Current Medication Sig: Inject 1 mg subcutaneously every 7 days   Rationale: Dose too low - Dosage too low - Effectiveness   Recommendation: Increase Dose   Status: Accepted per CPA   Identified Date: 11/5/2024 Completed Date: 11/5/2024         2 Current Medication: empagliflozin (JARDIANCE) 10 MG TABS tablet   Current Medication Sig: Take 1 tablet (10 mg) by mouth daily.   Rationale: Synergistic therapy - Needs additional medication therapy - Indication   Recommendation: Start Medication   Status: Accepted per CPA   Identified Date: 11/5/2024 Completed Date: 11/5/2024

## 2024-11-05 ENCOUNTER — OFFICE VISIT (OUTPATIENT)
Dept: PHARMACY | Facility: CLINIC | Age: 59
End: 2024-11-05
Payer: COMMERCIAL

## 2024-11-05 VITALS
HEART RATE: 71 BPM | WEIGHT: 174.4 LBS | DIASTOLIC BLOOD PRESSURE: 84 MMHG | SYSTOLIC BLOOD PRESSURE: 136 MMHG | OXYGEN SATURATION: 94 % | BODY MASS INDEX: 24.97 KG/M2

## 2024-11-05 DIAGNOSIS — I10 ESSENTIAL (PRIMARY) HYPERTENSION: ICD-10-CM

## 2024-11-05 DIAGNOSIS — E78.5 HYPERLIPIDEMIA, UNSPECIFIED HYPERLIPIDEMIA TYPE: ICD-10-CM

## 2024-11-05 DIAGNOSIS — E11.9 TYPE 2 DIABETES MELLITUS WITHOUT COMPLICATION, WITHOUT LONG-TERM CURRENT USE OF INSULIN (H): Primary | ICD-10-CM

## 2024-11-05 PROCEDURE — 99606 MTMS BY PHARM EST 15 MIN: CPT | Performed by: PHARMACIST

## 2024-11-05 PROCEDURE — 99607 MTMS BY PHARM ADDL 15 MIN: CPT | Performed by: PHARMACIST

## 2024-11-05 RX ORDER — INSULIN GLARGINE-YFGN 100 [IU]/ML
24 INJECTION, SOLUTION SUBCUTANEOUS DAILY
Qty: 30 ML | Refills: 3 | Status: SHIPPED | OUTPATIENT
Start: 2024-11-05

## 2024-11-05 NOTE — PATIENT INSTRUCTIONS
"Recommendations from today's MTM visit:                                                         Increase dose: Ozempic 2 mg weekly  Start Jardiance 10 mg daily  Continue Lantus 24 units daily    Follow-up: No follow-ups on file.    It was great speaking with you today.  I value your experience and would be very thankful for your time in providing feedback in our clinic survey. In the next few days, you may receive an email or text message from Pro [employer] MD [employer] with a link to a survey related to your  clinical pharmacist.\"     To schedule another MTM appointment, please call the clinic directly or you may call the MTM scheduling line at [phone].    My Clinical Pharmacist's contact information:                                                      Please feel free to contact me with any questions or concerns you have.      Lima Dong, PharmD, BCACP  Medication Therapy Management Pharmacist    "

## 2024-11-05 NOTE — Clinical Note
Elisa, If you look at my note you can see patients blood sugar data.  His overnight blood sugars have improved with the insulin but his postprandial blood sugars are still elevated.  I am increasing the Ozempic today to 2 mg and restarting his Jardiance.  If the blood sugars do not improve I may consider further increasing Jardiance to 25 mg daily.  Another option would be switching Ozempic to Mounjaro or adding mealtime insulin, though ideally I am hoping we do not need to add mealtime.  Do have any other thoughts to best manage his blood sugars?  As far as BP, his lisinopril dose was increased in August and his blood pressures are improving, meeting less strict goal of <140/90, but not yet meeting more strict goal <130/80 mmHg.  What would your blood pressure goal for him be?  Could consider further increasing lisinopril or adding calcium channel blocker (safe to do with HIV med). Would recommend rechecking BMP&A1c when you see him in Dec.  Let me know if questions Syed

## 2024-11-18 ENCOUNTER — MYC MEDICAL ADVICE (OUTPATIENT)
Dept: PHARMACY | Facility: CLINIC | Age: 59
End: 2024-11-18
Payer: COMMERCIAL

## 2024-12-16 DIAGNOSIS — E11.9 TYPE 2 DIABETES MELLITUS WITHOUT COMPLICATION, WITHOUT LONG-TERM CURRENT USE OF INSULIN (H): Primary | ICD-10-CM

## 2024-12-17 ENCOUNTER — OFFICE VISIT (OUTPATIENT)
Dept: FAMILY MEDICINE | Facility: CLINIC | Age: 59
End: 2024-12-17
Payer: COMMERCIAL

## 2024-12-17 VITALS
HEART RATE: 64 BPM | BODY MASS INDEX: 25.13 KG/M2 | WEIGHT: 175.5 LBS | SYSTOLIC BLOOD PRESSURE: 137 MMHG | TEMPERATURE: 97.4 F | RESPIRATION RATE: 12 BRPM | HEIGHT: 70 IN | DIASTOLIC BLOOD PRESSURE: 82 MMHG | OXYGEN SATURATION: 99 %

## 2024-12-17 DIAGNOSIS — Z21 ASYMPTOMATIC HIV INFECTION, WITH NO HISTORY OF HIV-RELATED ILLNESS (H): ICD-10-CM

## 2024-12-17 DIAGNOSIS — E11.40 CONTROLLED TYPE 2 DIABETES MELLITUS WITH DIABETIC NEUROPATHY, WITHOUT LONG-TERM CURRENT USE OF INSULIN (H): ICD-10-CM

## 2024-12-17 DIAGNOSIS — E11.9 TYPE 2 DIABETES MELLITUS WITHOUT COMPLICATION, WITHOUT LONG-TERM CURRENT USE OF INSULIN (H): Primary | ICD-10-CM

## 2024-12-17 DIAGNOSIS — I10 ESSENTIAL (PRIMARY) HYPERTENSION: ICD-10-CM

## 2024-12-17 LAB
ATRIAL RATE - MUSE: 57 BPM
DIASTOLIC BLOOD PRESSURE - MUSE: NORMAL MMHG
EST. AVERAGE GLUCOSE BLD GHB EST-MCNC: 174 MG/DL
HBA1C MFR BLD: 7.7 % (ref 0–5.6)
HOLD SPECIMEN: NORMAL
INTERPRETATION ECG - MUSE: NORMAL
P AXIS - MUSE: 23 DEGREES
PR INTERVAL - MUSE: 176 MS
QRS DURATION - MUSE: 108 MS
QT - MUSE: 432 MS
QTC - MUSE: 420 MS
R AXIS - MUSE: -14 DEGREES
SYSTOLIC BLOOD PRESSURE - MUSE: NORMAL MMHG
T AXIS - MUSE: 9 DEGREES
VENTRICULAR RATE- MUSE: 57 BPM

## 2024-12-17 PROCEDURE — 83036 HEMOGLOBIN GLYCOSYLATED A1C: CPT | Performed by: FAMILY MEDICINE

## 2024-12-17 PROCEDURE — 99215 OFFICE O/P EST HI 40 MIN: CPT | Performed by: FAMILY MEDICINE

## 2024-12-17 PROCEDURE — 36415 COLL VENOUS BLD VENIPUNCTURE: CPT | Performed by: FAMILY MEDICINE

## 2024-12-17 PROCEDURE — 93005 ELECTROCARDIOGRAM TRACING: CPT | Performed by: FAMILY MEDICINE

## 2024-12-17 NOTE — PROGRESS NOTES
"  Assessment & Plan     Type 2 diabetes mellitus without complication, without long-term current use of insulin (H)  A1-C now 7.7 (down from 11.2 last time). This is terrific! He is pleased, too. Continue same lifestyle and meds. He will now move ahead with his plans for a 2nd knee replacement in a few months.  - Hemoglobin A1c  - EKG 12-lead, tracing only  - EKG 12-lead, tracing only    Essential (primary) hypertension  Controlled. Continue same meds. Check EKG for baseline rere in light of upcoming knee replacement. EKG showed bradycardia but NSR.  - EKG 12-lead, tracing only  - EKG 12-lead, tracing only    Controlled type 2 diabetes mellitus with diabetic neuropathy, without long-term current use of insulin (H)  As above  - EKG 12-lead, tracing only  - EKG 12-lead, tracing only    Asymptomatic HIV infection, with no history of HIV-related illness (H)  Managed by infectious disease  - EKG 12-lead, tracing only  - EKG 12-lead, tracing only    BMI  Estimated body mass index is 25.12 kg/m  as calculated from the following:    Height as of this encounter: 1.78 m (5' 10.08\").    Weight as of this encounter: 79.6 kg (175 lb 8 oz).   Weight management plan: Discussed healthy diet and exercise guidelines    On the day of service, I spent 40 minutes with the patient, preparing for the visit with chart review, and charting.                        Emile Ambriz is a 59 year old, presenting for the following health issues:  Diabetes      12/17/2024    10:13 AM   Additional Questions   Roomed by ABRAN [facility]   Accompanied by self     History of Present Illness       Diabetes:   He presents for follow up of diabetes.   He is checking home blood glucose with a continuous glucose monitor.   He checks blood glucose before and after meals.  Blood glucose is sometimes over 200 and sometimes under 70. He is aware of hypoglycemia symptoms including shakiness.   He is concerned about blood sugar frequently over 200.   He is " "having numbness in feet.            He eats 2-3 servings of fruits and vegetables daily.He consumes 0 sweetened beverage(s) daily.He exercises with enough effort to increase his heart rate 9 or less minutes per day.  He exercises with enough effort to increase his heart rate 3 or less days per week.   He is taking medications regularly.     Horrible cruise - friends got sick - [place] and Toribio is with her (he is her power of atty)  Pb does not get really stressed out - he just takes things as they come.    Work on the house is paused.    After breakfast - sugars always go to 300ish - he knows his breakfast is \"sugar-heavy\" with Greek yogurt, granola, berries.    Exercise - mainly walking;     Hoping to have right knee replacement in late winter - [facility] - Dr. Flores    Slight change in vision - since starting Ozempic    Chills - centers in back - not sure what this is.        Objective    /82 (BP Location: Left arm, Patient Position: Sitting, Cuff Size: Adult Regular)   Pulse 64   Temp 97.4  F (36.3  C) (Oral)   Resp 12   Ht 1.78 m (5' 10.08\")   Wt 79.6 kg (175 lb 8 oz)   SpO2 99%   BMI 25.12 kg/m    Body mass index is 25.12 kg/m .  Physical Exam   GENERAL: alert and no distress  RESP: lungs clear to auscultation - no rales, rhonchi or wheezes  CV: regular rate and rhythm, normal S1 S2, no S3 or S4, no murmur, click or rub, no peripheral edema  MS: no gross musculoskeletal defects noted, no edema  SKIN: no suspicious lesions or rashes; skin on back has no rash, is warm/pink to the touch  PSYCH: mentation appears normal, affect normal/bright    Office Visit on 08/14/2024   Component Date Value Ref Range Status    Hemoglobin A1C 08/14/2024 11.2 (H)  0.0 - 5.6 % Final    Normal <5.7%   Prediabetes 5.7-6.4%    Diabetes 6.5% or higher     Note: Adopted from ADA consensus guidelines.    Sodium 08/14/2024 136  135 - 145 mmol/L Final    Potassium 08/14/2024 4.4  3.4 - " 5.3 mmol/L Final    Chloride 08/14/2024 101  98 - 107 mmol/L Final    Carbon Dioxide (CO2) 08/14/2024 24  22 - 29 mmol/L Final    Anion Gap 08/14/2024 11  7 - 15 mmol/L Final    Urea Nitrogen 08/14/2024 25.3 (H)  8.0 - 23.0 mg/dL Final    Creatinine 08/14/2024 0.99  0.67 - 1.17 mg/dL Final    GFR Estimate 08/14/2024 87  >60 mL/min/1.73m2 Final    eGFR calculated using 2021 CKD-EPI equation.    Calcium 08/14/2024 9.4  8.8 - 10.4 mg/dL Final    Reference intervals for this test were updated on 7/16/2024 to reflect our healthy population more accurately. There may be differences in the flagging of prior results with similar values performed with this method. Those prior results can be interpreted in the context of the updated reference intervals.    Glucose 08/14/2024 309 (H)  70 - 99 mg/dL Final     Results for orders placed or performed in visit on 12/17/24   Hemoglobin A1c     Status: Abnormal   Result Value Ref Range    Estimated Average Glucose 174 (H) <117 mg/dL    Hemoglobin A1C 7.7 (H) 0.0 - 5.6 %    Narrative    Results confirmed by repeat test.    Extra Tube     Status: None    Narrative    The following orders were created for panel order Extra Tube.  Procedure                               Abnormality         Status                     ---------                               -----------         ------                     Extra Green Top (Lithium...[[id number]]                      Final result                 Please view results for these tests on the individual orders.   Extra Green Top (Lithium Heparin) Tube     Status: None   Result Value Ref Range    Hold [facility]    EKG 12-lead, tracing only     Status: None (Preliminary result)   Result Value Ref Range    Systolic Blood Pressure  mmHg    Diastolic Blood Pressure  mmHg    Ventricular Rate 57 BPM    Atrial Rate 57 BPM    MS Interval 176 ms    QRS Duration 108 ms     ms    QTc 420 ms    P Axis 23 degrees    R AXIS -14 degrees    T Axis 9 degrees     Interpretation ECG       Sinus bradycardia  Otherwise normal ECG  When compared with ECG of 04-Nov-2013 09:20,  No significant change was found             Signed Electronically by: Nuha Paredes MD

## 2024-12-26 LAB
ATRIAL RATE - MUSE: 57 BPM
DIASTOLIC BLOOD PRESSURE - MUSE: NORMAL MMHG
INTERPRETATION ECG - MUSE: NORMAL
P AXIS - MUSE: 23 DEGREES
PR INTERVAL - MUSE: 176 MS
QRS DURATION - MUSE: 108 MS
QT - MUSE: 432 MS
QTC - MUSE: 420 MS
R AXIS - MUSE: -14 DEGREES
SYSTOLIC BLOOD PRESSURE - MUSE: NORMAL MMHG
T AXIS - MUSE: 9 DEGREES
VENTRICULAR RATE- MUSE: 57 BPM

## 2025-03-19 ENCOUNTER — OFFICE VISIT (OUTPATIENT)
Dept: FAMILY MEDICINE | Facility: CLINIC | Age: 60
End: 2025-03-19
Payer: COMMERCIAL

## 2025-03-19 ENCOUNTER — TELEPHONE (OUTPATIENT)
Dept: FAMILY MEDICINE | Facility: CLINIC | Age: 60
End: 2025-03-19

## 2025-03-19 VITALS
DIASTOLIC BLOOD PRESSURE: 79 MMHG | HEART RATE: 76 BPM | OXYGEN SATURATION: 96 % | BODY MASS INDEX: 25.44 KG/M2 | HEIGHT: 69 IN | WEIGHT: 171.75 LBS | TEMPERATURE: 98.1 F | SYSTOLIC BLOOD PRESSURE: 128 MMHG | RESPIRATION RATE: 16 BRPM

## 2025-03-19 DIAGNOSIS — E78.2 MIXED HYPERLIPIDEMIA: ICD-10-CM

## 2025-03-19 DIAGNOSIS — G89.29 CHRONIC PAIN OF RIGHT KNEE: ICD-10-CM

## 2025-03-19 DIAGNOSIS — Z00.00 PREVENTATIVE HEALTH CARE: ICD-10-CM

## 2025-03-19 DIAGNOSIS — E11.9 TYPE 2 DIABETES MELLITUS WITHOUT COMPLICATION, WITHOUT LONG-TERM CURRENT USE OF INSULIN (H): ICD-10-CM

## 2025-03-19 DIAGNOSIS — Z01.818 PREOP GENERAL PHYSICAL EXAM: Primary | ICD-10-CM

## 2025-03-19 DIAGNOSIS — B20 CURRENTLY ASYMPTOMATIC HIV INFECTION, WITH HISTORY OF HIV-RELATED ILLNESS (H): ICD-10-CM

## 2025-03-19 DIAGNOSIS — N18.32 STAGE 3B CHRONIC KIDNEY DISEASE (H): ICD-10-CM

## 2025-03-19 DIAGNOSIS — Z01.84 IMMUNITY TO MEASLES DETERMINED BY SEROLOGIC TEST: Primary | ICD-10-CM

## 2025-03-19 DIAGNOSIS — M25.561 CHRONIC PAIN OF RIGHT KNEE: ICD-10-CM

## 2025-03-19 DIAGNOSIS — I10 ESSENTIAL (PRIMARY) HYPERTENSION: ICD-10-CM

## 2025-03-19 DIAGNOSIS — G47.33 OBSTRUCTIVE SLEEP APNEA (ADULT) (PEDIATRIC): ICD-10-CM

## 2025-03-19 LAB
ALBUMIN UR-MCNC: NEGATIVE MG/DL
APPEARANCE UR: CLEAR
BASOPHILS # BLD AUTO: 0.1 10E3/UL (ref 0–0.2)
BASOPHILS NFR BLD AUTO: 1 %
BILIRUB UR QL STRIP: NEGATIVE
CHOLEST SERPL-MCNC: 140 MG/DL
COLOR UR AUTO: YELLOW
CREAT UR-MCNC: 47.9 MG/DL
EOSINOPHIL # BLD AUTO: 0.2 10E3/UL (ref 0–0.7)
EOSINOPHIL NFR BLD AUTO: 2 %
ERYTHROCYTE [DISTWIDTH] IN BLOOD BY AUTOMATED COUNT: 13.5 % (ref 10–15)
EST. AVERAGE GLUCOSE BLD GHB EST-MCNC: 148 MG/DL
FASTING STATUS PATIENT QL REPORTED: NO
GLUCOSE UR STRIP-MCNC: 500 MG/DL
HBA1C MFR BLD: 6.8 % (ref 0–5.6)
HCT VFR BLD AUTO: 46.2 % (ref 40–53)
HDLC SERPL-MCNC: 42 MG/DL
HGB BLD-MCNC: 15.7 G/DL (ref 13.3–17.7)
HGB UR QL STRIP: NEGATIVE
IMM GRANULOCYTES # BLD: 0 10E3/UL
IMM GRANULOCYTES NFR BLD: 0 %
KETONES UR STRIP-MCNC: NEGATIVE MG/DL
LDLC SERPL CALC-MCNC: 68 MG/DL
LEUKOCYTE ESTERASE UR QL STRIP: NEGATIVE
LYMPHOCYTES # BLD AUTO: 2.6 10E3/UL (ref 0.8–5.3)
LYMPHOCYTES NFR BLD AUTO: 39 %
MCH RBC QN AUTO: 29.7 PG (ref 26.5–33)
MCHC RBC AUTO-ENTMCNC: 34 G/DL (ref 31.5–36.5)
MCV RBC AUTO: 87 FL (ref 78–100)
MICROALBUMIN UR-MCNC: <12 MG/L
MICROALBUMIN/CREAT UR: NORMAL MG/G{CREAT}
MONOCYTES # BLD AUTO: 0.5 10E3/UL (ref 0–1.3)
MONOCYTES NFR BLD AUTO: 7 %
NEUTROPHILS # BLD AUTO: 3.5 10E3/UL (ref 1.6–8.3)
NEUTROPHILS NFR BLD AUTO: 51 %
NITRATE UR QL: NEGATIVE
NONHDLC SERPL-MCNC: 98 MG/DL
PH UR STRIP: 5.5 [PH] (ref 5–7)
PLATELET # BLD AUTO: 318 10E3/UL (ref 150–450)
RBC # BLD AUTO: 5.29 10E6/UL (ref 4.4–5.9)
SP GR UR STRIP: 1.01 (ref 1–1.03)
TRIGL SERPL-MCNC: 148 MG/DL
UROBILINOGEN UR STRIP-ACNC: 0.2 E.U./DL
WBC # BLD AUTO: 6.8 10E3/UL (ref 4–11)

## 2025-03-19 PROCEDURE — 3078F DIAST BP <80 MM HG: CPT | Performed by: FAMILY MEDICINE

## 2025-03-19 PROCEDURE — 83036 HEMOGLOBIN GLYCOSYLATED A1C: CPT | Performed by: FAMILY MEDICINE

## 2025-03-19 PROCEDURE — 36415 COLL VENOUS BLD VENIPUNCTURE: CPT | Performed by: FAMILY MEDICINE

## 2025-03-19 PROCEDURE — 81003 URINALYSIS AUTO W/O SCOPE: CPT | Performed by: FAMILY MEDICINE

## 2025-03-19 PROCEDURE — 3074F SYST BP LT 130 MM HG: CPT | Performed by: FAMILY MEDICINE

## 2025-03-19 PROCEDURE — 99214 OFFICE O/P EST MOD 30 MIN: CPT | Performed by: FAMILY MEDICINE

## 2025-03-19 PROCEDURE — 85025 COMPLETE CBC W/AUTO DIFF WBC: CPT | Performed by: FAMILY MEDICINE

## 2025-03-19 RX ORDER — LISINOPRIL 40 MG/1
40 TABLET ORAL DAILY
Qty: 90 TABLET | Refills: 4 | Status: SHIPPED | OUTPATIENT
Start: 2025-03-19

## 2025-03-19 NOTE — TELEPHONE ENCOUNTER
[facility] called stated that pt should have measles antibody testing done. Please call back if you have any questions at [phone]

## 2025-03-19 NOTE — PROGRESS NOTES
Preoperative Evaluation  [address]  [place] [address]  Phone: [phone]  Fax: [phone]  Primary Provider: Nuha Paredes MD  Pre-op Performing Provider: Nuha Paredes MD  Mar 19, 2025             3/18/2025   Surgical Information   What procedure is being done? Total knee replacement of right knee   Facility or Hospital where procedure/surgery will be performed: [facility]   Who is doing the procedure / surgery? Stephen Flores   Date of surgery / procedure: 04/08/25   Time of surgery / procedure: Unknown   Where do you plan to recover after surgery? at home with family     Fax number for surgical facility: not known    Assessment & Plan     The proposed surgical procedure is considered LOW risk.    Preop general physical exam  Ready for right knee replacement.  - CBC with platelets and differential  - Comprehensive metabolic panel (BMP + Alb, Alk Phos, ALT, AST, Total. Bili, TP)  - UA Macroscopic with reflex to Microscopic and Culture - Lab Collect  - Lipid panel reflex to direct LDL Non-fasting  - Hemoglobin A1c    Type 2 diabetes mellitus without complication, without long-term current use of insulin (H)  A1-C today is 6.8 which is terrific! He worked hard to attain that.  - CBC with platelets and differential  - Comprehensive metabolic panel (BMP + Alb, Alk Phos, ALT, AST, Total. Bili, TP)  - UA Macroscopic with reflex to Microscopic and Culture - Lab Collect  - Lipid panel reflex to direct LDL Non-fasting  - Hemoglobin A1c  - Albumin Random Urine Quantitative with Creat Ratio    Essential (primary) hypertension   Controlled well. His cardiologist recently increased his lisinopril from 20 to 40mg. Thus, it is good we are checking his electrolytes today.  - CBC with platelets and differential  - Comprehensive metabolic panel (BMP + Alb, Alk Phos, ALT, AST, Total. Bili, TP)  - UA Macroscopic with reflex to Microscopic and Culture - Lab  Collect  - lisinopril (ZESTRIL) 40 MG tablet  Dispense: 90 tablet; Refill: 4    Stage 3b chronic kidney disease (H)  On ACE inhibitor.  - CBC with platelets and differential  - Comprehensive metabolic panel (BMP + Alb, Alk Phos, ALT, AST, Total. Bili, TP)  - UA Macroscopic with reflex to Microscopic and Culture - Lab Collect  - Albumin Random Urine Quantitative with Creat Ratio    Preventative health care  - REVIEW OF HEALTH MAINTENANCE PROTOCOL ORDERS       - No identified additional risk factors other than previously addressed    Antiplatelet or Anticoagulation Medication Instructions   - We reviewed the medication list and the patient is not on an antiplatelet or anticoagulation medications.    Additional Medication Instructions  Instructing him on what meds to take the day of surgery is tricky since the time of surgery is not currently known. At this time, he will 1) stop vitamin and supplements 1 week prior to surgery, 2)  take his lantus insulin but NO oral diabetes meds the morning of surgery, 3) still take semaglutide injection the Sunday before surgery.    Recommendation  Approval given to proceed with proposed procedure, without further diagnostic evaluation.              Emile Ambriz is a 59 year old, presenting for the following:  Pre-Op Exam    Day of surgery - avoid sugar/diabetes related meds.  Has been dropping sugars, so he added a protein bar - [employer] and a [employer] one. Sundays - does Ozempic  Hold vitamins and supplements 1 week ahead  General anesthesia.    Measles?        3/19/2025    10:25 AM   Additional Questions   Roomed by ABRAN [facility]   Accompanied by self     HPI: he is ready for his knee replacement - he is really tired of the constant knee pain. He expects the hard work for recovery, but hopes he and his partner can get the things done they want to before the surgery.          3/18/2025   Pre-Op Questionnaire   Have you ever had a heart attack or stroke? No   Have you  ever had surgery on your heart or blood vessels, such as a stent placement, a coronary artery bypass, or surgery on an artery in your head, neck, heart, or legs? No   Do you have chest pain with activity? No   Do you have a history of heart failure? No   Do you currently have a cold, bronchitis or symptoms of other infection? No   Do you have a cough, shortness of breath, or wheezing? No   Do you or anyone in your family have previous history of blood clots? No   Do you or does anyone in your family have a serious bleeding problem such as prolonged bleeding following surgeries or cuts? No   Have you ever had problems with anemia or been told to take iron pills? No   Have you had any abnormal blood loss such as black, tarry or bloody stools? No   Have you ever had a blood transfusion? No   Are you willing to have a blood transfusion if it is medically needed before, during, or after your surgery? Yes   Have you or any of your relatives ever had problems with anesthesia? (!) YES nausea waking   Do you have sleep apnea, excessive snoring or daytime drowsiness? (!) YES   Do you have a CPAP machine? Yes   Do you have any artifical heart valves or other implanted medical devices like a pacemaker, defibrillator, or continuous glucose monitor? No   Do you have artificial joints? (!) YES   Are you allergic to latex? No     Health Care Directive  Patient does not have a Health Care Directive: Patient states has Advance Directive and will bring in a copy to clinic.    Preoperative Review of    reviewed - no record of controlled substances prescribed.      Patient Active Problem List    Diagnosis Date Noted    Status post total left knee replacement 09/05/2023     Priority: Medium    Cholelithiasis 10/06/2022     Priority: Medium     Created by Conversion  Replacement Utility updated for latest IMO load      Formatting of this note might be different from the original.  Created by Conversion    Replacement Utility updated  for latest IMO load    Formatting of this note might be different from the original.  Formatting of this note might be different from the original.  Created by Conversion    Replacement Utility updated for latest IMO load  Formatting of this note might be different from the original.  Created by Conversion  Replacement Utility updated for latest IMO load    Formatting of this note might be different from the original.  Created by Conversion    Replacement Utility updated for latest IMO load      Hyperlipidemia 10/06/2022     Priority: Medium     Created by Conversion      Formatting of this note might be different from the original.  Created by Conversion    Formatting of this note might be different from the original.  Formatting of this note might be different from the original.  Created by Conversion  Formatting of this note might be different from the original.  Created by Conversion    Formatting of this note might be different from the original.  Created by Conversion      Recurrent umbilical hernia 10/06/2022     Priority: Medium    Benign prostatic hyperplasia 11/11/2019     Priority: Medium     Overview:   + chronic prostatitis.      Formatting of this note might be different from the original.  Overview:   + chronic prostatitis.    Formatting of this note might be different from the original.  + chronic prostatitis.    Formatting of this note might be different from the original.  Overview:   + chronic prostatitis.  Formatting of this note might be different from the original.  Overview:   + chronic prostatitis.    Formatting of this note might be different from the original.  Overview:   + chronic prostatitis.      Essential (primary) hypertension 11/11/2019     Priority: Medium    Peripheral neuropathy 12/20/2018     Priority: Medium    Benign neoplasm of transverse colon 04/26/2018     Priority: Medium    Diverticular disease of large intestine 04/26/2018     Priority: Medium    Type 2 diabetes mellitus  without complication (H)      Priority: Medium     Created by Conversion      Formatting of this note might be different from the original.  Created by Conversion      Obstructive sleep apnea (adult) (pediatric) 06/24/2016     Priority: Medium     Created by Conversion      Formatting of this note might be different from the original.  Created by Conversion    Formatting of this note might be different from the original.  Formatting of this note might be different from the original.  Created by Conversion  Formatting of this note might be different from the original.  Created by Conversion    Formatting of this note might be different from the original.  Created by Conversion      Diaphragmatic hernia 10/07/2015     Priority: Medium     Created by Conversion      Formatting of this note might be different from the original.  Created by Conversion      Fatigue      Priority: Medium     Created by Conversion      Formatting of this note might be different from the original.  Created by Conversion      Limb pain      Priority: Medium     Created by Conversion      Formatting of this note might be different from the original.  Created by Conversion      Difficulty Breathing (Dyspnea)      Priority: Medium     Created by Conversion      Formatting of this note might be different from the original.  Created by Conversion      HIV infection (H)      Priority: Medium     Created by Conversion      Formatting of this note might be different from the original.  Created by Conversion    Formatting of this note might be different from the original.  KH. HIV+ 1992. CD4 kwaku 82 1996 (AIDS). HIV Peak [id number]. ARVs dinvr 1993.    Diagnosed: 1992  Prior genotypes/Resistance mutations: M41L, M184V, T215Y, M36I  CD4 kwaku: 82 on 1996  Prior HIV regimens: Mulitple  History of OIs: None      Gastroesophageal reflux disease without esophagitis 12/29/2009     Priority: Medium     Created by Conversion      Formatting of this note  might be different from the original.  Created by Conversion    Formatting of this note might be different from the original.  Formatting of this note might be different from the original.  Created by Conversion  Formatting of this note might be different from the original.  Created by Conversion    Formatting of this note might be different from the original.  Created by Conversion        Past Medical History:   Diagnosis Date    Arthritis     Osteoarthritis    Sandoval's esophagus     DM (diabetes mellitus) (H)     diagnosed in early 50s; does not tolerate metformin    GERD (gastroesophageal reflux disease)     HIV (human immunodeficiency virus infection) (H) 1991    HLD (hyperlipidemia)     HTN (hypertension)     MARIE (obstructive sleep apnea)     Status post total left knee replacement      Past Surgical History:   Procedure Laterality Date    ABDOMEN SURGERY      Gal bladder    BONE REPLACEMENT GRAFT      scaphoid graft     CHOLECYSTECTOMY      CHOLECYSTECTOMY      COLONOSCOPY      ENT SURGERY      Thyroglocal duct cyst removal    EXCISE CYST THYROGLOSSAL DUCT      HERNIA REPAIR      umibilical and right inguinal    INCISION AND DRAINAGE / EXCISION THYROGLOSSAL CYST      JOINT REPLACEMENT      JOINT REPLACEMENT      ORTHOPEDIC SURGERY      Knee replacement    OTHER SURGICAL HISTORY      left TKA    OTHER SURGICAL HISTORY      left wist surgery     Current Outpatient Medications   Medication Sig Dispense Refill    atorvastatin (LIPITOR) 10 MG tablet Take 10 mg by mouth daily      blood glucose (PRECISION QID TEST) test strip 1 each      Blood Glucose Monitoring Suppl (FIFTY50 GLUCOSE METER 2.0) w/Device KIT Use the Verio IQ meter as directed to check blood sugar      Continuous Glucose Sensor (FREESTYLE CAMILO 3 PLUS SENSOR) MISC Use 1 sensor every 15 days. Use to read blood sugars per 's instructions. 6 each 3    empagliflozin (JARDIANCE) 10 MG TABS tablet Take 1 tablet (10 mg) by mouth daily. 90  "tablet 1    famotidine (PEPCID) 20 MG tablet Take 20 mg by mouth daily      fenofibrate (TRICOR) 145 MG tablet Take 145 mg by mouth      glimepiride (AMARYL) 4 MG tablet TAKE 2 TABLETS(8 MG) BY MOUTH EVERY MORNING BEFORE BREAKFAST 180 tablet 4    Insulin Glargine-yfgn (SEMGLEE, YFGN,) 100 UNIT/ML SOPN Inject 24 Units subcutaneously daily. 30 mL 3    insulin pen needle (BD QUOC U/F) 32G X 4 MM miscellaneous Use once daily as directed. 100 each 3    JULUCA 50-25 MG TABS Take 1 tablet by mouth daily      lisinopril (ZESTRIL) 20 MG tablet Take 20 mg by mouth daily.      Multiple Vitamins-Minerals (ONCOVITE) TABS Take 1 tablet by mouth      ONETOUCH VERIO IQ test strip Use to test blood sugar 1 times daily or as directed. 100 strip 11    Respiratory Therapy Supplies (CARETOUCH 2 CPAP HOSE ) MISC CPAP machine for home use at pressure: 5-20 cm, full face mask x1/3month with a full face cushion x1/mo      Semaglutide, 2 MG/DOSE, (OZEMPIC) 8 MG/3ML pen Inject 2 mg subcutaneously every 7 days. 9 mL 3    terazosin (HYTRIN) 2 MG capsule   3    valACYclovir (VALTREX) 500 MG tablet   0    AgaMatrix Ultra-Thin Lancets MISC 1 each      diclofenac (VOLTAREN) 1 % topical gel Apply 4 g topically 4 times daily Right knee, left shoulder and left hand (Patient not taking: Reported on 3/19/2025) 100 g 4       Allergies   Allergen Reactions    Cephalexin Hives     \"severe hives\"      Metformin Hives    Cephalosporins      Severe hives with keflex    Codeine Nausea    Oxycodone Nausea and Vomiting    Penicillins Itching, Rash and Unknown     rash      Sulfa Antibiotics Unknown, Itching and Rash     rash          Social History     Tobacco Use    Smoking status: Never     Passive exposure: Never    Smokeless tobacco: Never   Substance Use Topics    Alcohol use: Yes     Comment: Alcoholic Drinks/day: Occasionally     Family History   Problem Relation Age of Onset    Diabetes Mother 30    Cancer Father         prostate     Diabetes " "Father 80    Prostate Cancer Father     Diabetes Sister 40    Diabetes Brother 55    Diabetes Brother 50    Hyperlipidemia Brother     Cancer Maternal Aunt         unknown     Cancer Paternal Uncle         leukemia     Cancer Nephew         leukemia      History   Drug Use No           Objective    /79 (BP Location: Right arm, Patient Position: Sitting, Cuff Size: Adult Regular)   Pulse 76   Temp 98.1  F (36.7  C) (Oral)   Resp 16   Ht 1.76 m (5' 9.29\")   Wt 77.9 kg (171 lb 12 oz)   SpO2 96%   BMI 25.15 kg/m     Estimated body mass index is 25.15 kg/m  as calculated from the following:    Height as of this encounter: 1.76 m (5' 9.29\").    Weight as of this encounter: 77.9 kg (171 lb 12 oz).  Physical Exam  GENERAL: alert and no distress  EYES: Eyes grossly normal to inspection, PERRL and conjunctivae and sclerae normal  HENT: ear canals and TM's normal, nose and mouth without ulcers or lesions  NECK: no adenopathy, no asymmetry, masses, or scars  RESP: lungs clear to auscultation - no rales, rhonchi or wheezes  CV: regular rate and rhythm, normal S1 S2, no S3 or S4, no murmur, click or rub, no peripheral edema  ABDOMEN: soft, nontender, no hepatosplenomegaly, no masses and bowel sounds normal  MS: no gross musculoskeletal defects noted, no edema  SKIN: no suspicious lesions or rashes  NEURO: Normal strength and tone, mentation intact and speech normal  PSYCH: mentation appears normal, affect normal/bright    Recent Labs   Lab Test 03/19/25  1017 12/17/24  1008 08/14/24  0913 05/08/24  1433   HGB 15.7  --   --  17.0     --   --  296   NA  --   --  136 139   POTASSIUM  --   --  4.4 4.7   CR  --   --  0.99 1.21*   A1C 6.8* 7.7* 11.2* 8.6*        Diagnostics  Labs pending at this time.  Results will be reviewed when available.   No EKG this visit, completed in the last 90 days. EKG NSR, normal EKG    Revised Cardiac Risk Index (RCRI)  The patient has the following serious cardiovascular risks for " perioperative complications:   - No serious cardiac risks = 0 points     RCRI Interpretation: 1 point: Class II (low risk - 0.9% complication rate)         Signed Electronically by: Nuha Paredes MD  A copy of this evaluation report is provided to the requesting physician.

## 2025-03-20 LAB
ALBUMIN SERPL BCG-MCNC: 4.4 G/DL (ref 3.5–5.2)
ALP SERPL-CCNC: 39 U/L (ref 40–150)
ALT SERPL W P-5'-P-CCNC: 21 U/L (ref 0–70)
ANION GAP SERPL CALCULATED.3IONS-SCNC: 12 MMOL/L (ref 7–15)
AST SERPL W P-5'-P-CCNC: 25 U/L (ref 0–45)
BILIRUB SERPL-MCNC: 0.4 MG/DL
BUN SERPL-MCNC: 33.7 MG/DL (ref 8–23)
CALCIUM SERPL-MCNC: ABNORMAL MG/DL
CHLORIDE SERPL-SCNC: 105 MMOL/L (ref 98–107)
CREAT SERPL-MCNC: 1.15 MG/DL (ref 0.67–1.17)
EGFRCR SERPLBLD CKD-EPI 2021: 72 ML/MIN/1.73M2
FASTING STATUS PATIENT QL REPORTED: NO
GLUCOSE SERPL-MCNC: 177 MG/DL (ref 70–99)
HCO3 SERPL-SCNC: 23 MMOL/L (ref 22–29)
POTASSIUM SERPL-SCNC: 4.5 MMOL/L (ref 3.4–5.3)
PROT SERPL-MCNC: 8.5 G/DL (ref 6.4–8.3)
SODIUM SERPL-SCNC: 140 MMOL/L (ref 135–145)

## 2025-03-27 ENCOUNTER — MYC MEDICAL ADVICE (OUTPATIENT)
Dept: FAMILY MEDICINE | Facility: CLINIC | Age: 60
End: 2025-03-27
Payer: COMMERCIAL

## 2025-03-27 NOTE — TELEPHONE ENCOUNTER
Document printed and faxed as requested to orthopedic provider. Patient notified/updated via [employer].

## 2025-04-08 ENCOUNTER — TRANSFERRED RECORDS (OUTPATIENT)
Dept: HEALTH INFORMATION MANAGEMENT | Facility: CLINIC | Age: 60
End: 2025-04-08
Payer: COMMERCIAL

## 2025-04-29 DIAGNOSIS — E11.9 TYPE 2 DIABETES MELLITUS WITHOUT COMPLICATION, WITHOUT LONG-TERM CURRENT USE OF INSULIN (H): ICD-10-CM

## 2025-04-29 RX ORDER — EMPAGLIFLOZIN 10 MG/1
10 TABLET, FILM COATED ORAL DAILY
Qty: 90 TABLET | Refills: 0 | Status: SHIPPED | OUTPATIENT
Start: 2025-04-29

## 2025-05-21 ENCOUNTER — TRANSFERRED RECORDS (OUTPATIENT)
Dept: HEALTH INFORMATION MANAGEMENT | Facility: CLINIC | Age: 60
End: 2025-05-21
Payer: COMMERCIAL